# Patient Record
Sex: FEMALE | Race: ASIAN | NOT HISPANIC OR LATINO | Employment: OTHER | URBAN - METROPOLITAN AREA
[De-identification: names, ages, dates, MRNs, and addresses within clinical notes are randomized per-mention and may not be internally consistent; named-entity substitution may affect disease eponyms.]

---

## 2017-01-03 ENCOUNTER — OFFICE VISIT - HEALTHEAST (OUTPATIENT)
Dept: OCCUPATIONAL THERAPY | Facility: REHABILITATION | Age: 72
End: 2017-01-03

## 2017-01-03 DIAGNOSIS — R29.898 WEAKNESS OF RIGHT ARM: ICD-10-CM

## 2017-01-03 DIAGNOSIS — M25.531 PAIN IN JOINT, FOREARM, RIGHT: ICD-10-CM

## 2017-01-03 DIAGNOSIS — Z78.9 DECREASED ACTIVITIES OF DAILY LIVING (ADL): ICD-10-CM

## 2017-01-06 ENCOUNTER — OFFICE VISIT - HEALTHEAST (OUTPATIENT)
Dept: OCCUPATIONAL THERAPY | Facility: REHABILITATION | Age: 72
End: 2017-01-06

## 2017-01-06 DIAGNOSIS — Z78.9 DECREASED ACTIVITIES OF DAILY LIVING (ADL): ICD-10-CM

## 2017-01-06 DIAGNOSIS — R29.898 WEAKNESS OF RIGHT ARM: ICD-10-CM

## 2017-01-06 DIAGNOSIS — M25.531 PAIN IN JOINT, FOREARM, RIGHT: ICD-10-CM

## 2017-01-16 ENCOUNTER — COMMUNICATION - HEALTHEAST (OUTPATIENT)
Dept: INTERNAL MEDICINE | Facility: CLINIC | Age: 72
End: 2017-01-16

## 2017-01-16 DIAGNOSIS — M81.0 SENILE OSTEOPOROSIS: ICD-10-CM

## 2017-01-16 DIAGNOSIS — M89.9 DISORDER OF BONE AND CARTILAGE, UNSPECIFIED: ICD-10-CM

## 2017-01-16 DIAGNOSIS — M94.9 DISORDER OF BONE AND CARTILAGE, UNSPECIFIED: ICD-10-CM

## 2017-02-21 ENCOUNTER — COMMUNICATION - HEALTHEAST (OUTPATIENT)
Dept: INTERNAL MEDICINE | Facility: CLINIC | Age: 72
End: 2017-02-21

## 2017-02-21 DIAGNOSIS — M81.0 SENILE OSTEOPOROSIS: ICD-10-CM

## 2017-02-21 DIAGNOSIS — M89.9 DISORDER OF BONE AND CARTILAGE, UNSPECIFIED: ICD-10-CM

## 2017-02-21 DIAGNOSIS — M94.9 DISORDER OF BONE AND CARTILAGE, UNSPECIFIED: ICD-10-CM

## 2017-02-21 DIAGNOSIS — E78.5 HYPERLIPEMIA: ICD-10-CM

## 2017-02-23 ENCOUNTER — OFFICE VISIT - HEALTHEAST (OUTPATIENT)
Dept: INTERNAL MEDICINE | Facility: CLINIC | Age: 72
End: 2017-02-23

## 2017-02-23 DIAGNOSIS — J20.9 ACUTE BRONCHITIS, UNSPECIFIED ORGANISM: ICD-10-CM

## 2017-02-23 ASSESSMENT — MIFFLIN-ST. JEOR: SCORE: 970.1

## 2017-02-27 ENCOUNTER — COMMUNICATION - HEALTHEAST (OUTPATIENT)
Dept: INTERNAL MEDICINE | Facility: CLINIC | Age: 72
End: 2017-02-27

## 2017-02-27 DIAGNOSIS — E78.5 HYPERLIPEMIA: ICD-10-CM

## 2017-03-23 ENCOUNTER — COMMUNICATION - HEALTHEAST (OUTPATIENT)
Dept: INTERNAL MEDICINE | Facility: CLINIC | Age: 72
End: 2017-03-23

## 2017-03-23 DIAGNOSIS — E78.5 HYPERLIPEMIA: ICD-10-CM

## 2017-03-31 ENCOUNTER — COMMUNICATION - HEALTHEAST (OUTPATIENT)
Dept: INTERNAL MEDICINE | Facility: CLINIC | Age: 72
End: 2017-03-31

## 2017-03-31 DIAGNOSIS — M94.9 DISORDER OF BONE AND CARTILAGE, UNSPECIFIED: ICD-10-CM

## 2017-03-31 DIAGNOSIS — M89.9 DISORDER OF BONE AND CARTILAGE, UNSPECIFIED: ICD-10-CM

## 2017-03-31 DIAGNOSIS — M81.0 SENILE OSTEOPOROSIS: ICD-10-CM

## 2017-04-13 ENCOUNTER — COMMUNICATION - HEALTHEAST (OUTPATIENT)
Dept: INTERNAL MEDICINE | Facility: CLINIC | Age: 72
End: 2017-04-13

## 2017-04-13 DIAGNOSIS — M89.9 DISORDER OF BONE AND CARTILAGE, UNSPECIFIED: ICD-10-CM

## 2017-04-13 DIAGNOSIS — M94.9 DISORDER OF BONE AND CARTILAGE, UNSPECIFIED: ICD-10-CM

## 2017-04-13 DIAGNOSIS — M81.0 SENILE OSTEOPOROSIS: ICD-10-CM

## 2017-04-16 ENCOUNTER — COMMUNICATION - HEALTHEAST (OUTPATIENT)
Dept: INTERNAL MEDICINE | Facility: CLINIC | Age: 72
End: 2017-04-16

## 2017-04-16 DIAGNOSIS — E78.5 HYPERLIPEMIA: ICD-10-CM

## 2017-06-08 ENCOUNTER — COMMUNICATION - HEALTHEAST (OUTPATIENT)
Dept: INTERNAL MEDICINE | Facility: CLINIC | Age: 72
End: 2017-06-08

## 2017-06-08 DIAGNOSIS — E78.5 HYPERLIPEMIA: ICD-10-CM

## 2017-08-31 ENCOUNTER — RECORDS - HEALTHEAST (OUTPATIENT)
Dept: ADMINISTRATIVE | Facility: OTHER | Age: 72
End: 2017-08-31

## 2017-09-01 ENCOUNTER — COMMUNICATION - HEALTHEAST (OUTPATIENT)
Dept: INTERNAL MEDICINE | Facility: CLINIC | Age: 72
End: 2017-09-01

## 2017-09-06 ENCOUNTER — RECORDS - HEALTHEAST (OUTPATIENT)
Dept: ADMINISTRATIVE | Facility: OTHER | Age: 72
End: 2017-09-06

## 2017-11-02 ENCOUNTER — OFFICE VISIT - HEALTHEAST (OUTPATIENT)
Dept: INTERNAL MEDICINE | Facility: CLINIC | Age: 72
End: 2017-11-02

## 2017-11-02 ENCOUNTER — COMMUNICATION - HEALTHEAST (OUTPATIENT)
Dept: TELEHEALTH | Facility: CLINIC | Age: 72
End: 2017-11-02

## 2017-11-02 DIAGNOSIS — E55.9 VITAMIN D DEFICIENCY: ICD-10-CM

## 2017-11-02 DIAGNOSIS — R06.00 DYSPNEA: ICD-10-CM

## 2017-11-02 DIAGNOSIS — I50.9 CHF (CONGESTIVE HEART FAILURE) (H): ICD-10-CM

## 2017-11-02 DIAGNOSIS — R53.83 FATIGUE: ICD-10-CM

## 2017-11-02 DIAGNOSIS — E78.5 HYPERLIPEMIA: ICD-10-CM

## 2017-11-02 ASSESSMENT — MIFFLIN-ST. JEOR: SCORE: 979.62

## 2017-11-03 LAB
ATRIAL RATE - MUSE: 60 BPM
DIASTOLIC BLOOD PRESSURE - MUSE: NORMAL MMHG
INTERPRETATION ECG - MUSE: NORMAL
P AXIS - MUSE: 61 DEGREES
PR INTERVAL - MUSE: 152 MS
QRS DURATION - MUSE: 92 MS
QT - MUSE: 420 MS
QTC - MUSE: 420 MS
R AXIS - MUSE: 42 DEGREES
SYSTOLIC BLOOD PRESSURE - MUSE: NORMAL MMHG
T AXIS - MUSE: 19 DEGREES
VENTRICULAR RATE- MUSE: 60 BPM

## 2017-11-05 ENCOUNTER — COMMUNICATION - HEALTHEAST (OUTPATIENT)
Dept: INTERNAL MEDICINE | Facility: CLINIC | Age: 72
End: 2017-11-05

## 2017-11-17 ENCOUNTER — COMMUNICATION - HEALTHEAST (OUTPATIENT)
Dept: INTERNAL MEDICINE | Facility: CLINIC | Age: 72
End: 2017-11-17

## 2017-11-17 DIAGNOSIS — E78.5 HYPERLIPEMIA: ICD-10-CM

## 2017-11-28 ENCOUNTER — COMMUNICATION - HEALTHEAST (OUTPATIENT)
Dept: INTERNAL MEDICINE | Facility: CLINIC | Age: 72
End: 2017-11-28

## 2017-11-28 ENCOUNTER — RECORDS - HEALTHEAST (OUTPATIENT)
Dept: MAMMOGRAPHY | Facility: CLINIC | Age: 72
End: 2017-11-28

## 2017-11-28 DIAGNOSIS — Z12.31 ENCOUNTER FOR SCREENING MAMMOGRAM FOR MALIGNANT NEOPLASM OF BREAST: ICD-10-CM

## 2017-12-22 ENCOUNTER — COMMUNICATION - HEALTHEAST (OUTPATIENT)
Dept: INTERNAL MEDICINE | Facility: CLINIC | Age: 72
End: 2017-12-22

## 2017-12-22 DIAGNOSIS — Z91.09 ENVIRONMENTAL ALLERGIES: ICD-10-CM

## 2018-01-09 ENCOUNTER — OFFICE VISIT - HEALTHEAST (OUTPATIENT)
Dept: INTERNAL MEDICINE | Facility: CLINIC | Age: 73
End: 2018-01-09

## 2018-01-09 DIAGNOSIS — Z91.09 ENVIRONMENTAL ALLERGIES: ICD-10-CM

## 2018-01-09 DIAGNOSIS — Z79.899 MEDICATION MANAGEMENT: ICD-10-CM

## 2018-01-09 DIAGNOSIS — A09 TRAVELER'S DIARRHEA: ICD-10-CM

## 2018-01-09 DIAGNOSIS — E78.5 HYPERLIPEMIA: ICD-10-CM

## 2018-03-14 ENCOUNTER — COMMUNICATION - HEALTHEAST (OUTPATIENT)
Dept: INTERNAL MEDICINE | Facility: CLINIC | Age: 73
End: 2018-03-14

## 2018-03-14 DIAGNOSIS — M81.0 SENILE OSTEOPOROSIS: ICD-10-CM

## 2018-03-14 DIAGNOSIS — M94.9 DISORDER OF BONE AND CARTILAGE: ICD-10-CM

## 2018-03-14 DIAGNOSIS — M89.9 DISORDER OF BONE AND CARTILAGE: ICD-10-CM

## 2018-05-07 ENCOUNTER — OFFICE VISIT - HEALTHEAST (OUTPATIENT)
Dept: INTERNAL MEDICINE | Facility: CLINIC | Age: 73
End: 2018-05-07

## 2018-05-07 ENCOUNTER — COMMUNICATION - HEALTHEAST (OUTPATIENT)
Dept: INTERNAL MEDICINE | Facility: CLINIC | Age: 73
End: 2018-05-07

## 2018-05-07 DIAGNOSIS — Z12.9 SCREENING FOR CANCER: ICD-10-CM

## 2018-05-07 DIAGNOSIS — Z00.00 HEALTH CARE MAINTENANCE: ICD-10-CM

## 2018-05-07 DIAGNOSIS — M25.561 KNEE PAIN, BILATERAL: ICD-10-CM

## 2018-05-07 DIAGNOSIS — Z91.09 ENVIRONMENTAL ALLERGIES: ICD-10-CM

## 2018-05-07 DIAGNOSIS — E78.5 HYPERLIPEMIA: ICD-10-CM

## 2018-05-07 DIAGNOSIS — M25.562 KNEE PAIN, BILATERAL: ICD-10-CM

## 2018-05-07 LAB
ALBUMIN SERPL-MCNC: 3.8 G/DL (ref 3.5–5)
ALP SERPL-CCNC: 67 U/L (ref 45–120)
ALT SERPL W P-5'-P-CCNC: 14 U/L (ref 0–45)
ANION GAP SERPL CALCULATED.3IONS-SCNC: 9 MMOL/L (ref 5–18)
AST SERPL W P-5'-P-CCNC: 22 U/L (ref 0–40)
BILIRUB SERPL-MCNC: 0.7 MG/DL (ref 0–1)
BUN SERPL-MCNC: 20 MG/DL (ref 8–28)
CALCIUM SERPL-MCNC: 9.2 MG/DL (ref 8.5–10.5)
CHLORIDE BLD-SCNC: 107 MMOL/L (ref 98–107)
CHOLEST SERPL-MCNC: 178 MG/DL
CO2 SERPL-SCNC: 26 MMOL/L (ref 22–31)
CREAT SERPL-MCNC: 0.69 MG/DL (ref 0.6–1.1)
ERYTHROCYTE [DISTWIDTH] IN BLOOD BY AUTOMATED COUNT: 12.4 % (ref 11–14.5)
FASTING STATUS PATIENT QL REPORTED: YES
GFR SERPL CREATININE-BSD FRML MDRD: >60 ML/MIN/1.73M2
GLUCOSE BLD-MCNC: 89 MG/DL (ref 70–125)
HCT VFR BLD AUTO: 37.3 % (ref 35–47)
HDLC SERPL-MCNC: 66 MG/DL
HGB BLD-MCNC: 12.5 G/DL (ref 12–16)
LDLC SERPL CALC-MCNC: 96 MG/DL
MCH RBC QN AUTO: 30.1 PG (ref 27–34)
MCHC RBC AUTO-ENTMCNC: 33.4 G/DL (ref 32–36)
MCV RBC AUTO: 90 FL (ref 80–100)
PLATELET # BLD AUTO: 173 THOU/UL (ref 140–440)
PMV BLD AUTO: 8.2 FL (ref 7–10)
POTASSIUM BLD-SCNC: 3.8 MMOL/L (ref 3.5–5)
PROT SERPL-MCNC: 7.2 G/DL (ref 6–8)
RBC # BLD AUTO: 4.15 MILL/UL (ref 3.8–5.4)
SODIUM SERPL-SCNC: 142 MMOL/L (ref 136–145)
TRIGL SERPL-MCNC: 78 MG/DL
TSH SERPL DL<=0.005 MIU/L-ACNC: 1.57 UIU/ML (ref 0.3–5)
WBC: 3.7 THOU/UL (ref 4–11)

## 2018-05-07 ASSESSMENT — MIFFLIN-ST. JEOR: SCORE: 973.27

## 2018-05-08 ENCOUNTER — COMMUNICATION - HEALTHEAST (OUTPATIENT)
Dept: INTERNAL MEDICINE | Facility: CLINIC | Age: 73
End: 2018-05-08

## 2018-06-13 ENCOUNTER — RECORDS - HEALTHEAST (OUTPATIENT)
Dept: ADMINISTRATIVE | Facility: OTHER | Age: 73
End: 2018-06-13

## 2018-06-23 ENCOUNTER — AMBULATORY - HEALTHEAST (OUTPATIENT)
Dept: INTERNAL MEDICINE | Facility: CLINIC | Age: 73
End: 2018-06-23

## 2018-06-23 DIAGNOSIS — M25.561 KNEE PAIN, BILATERAL: ICD-10-CM

## 2018-06-23 DIAGNOSIS — M25.562 KNEE PAIN, BILATERAL: ICD-10-CM

## 2018-06-28 ENCOUNTER — OFFICE VISIT - HEALTHEAST (OUTPATIENT)
Dept: PHYSICAL THERAPY | Facility: REHABILITATION | Age: 73
End: 2018-06-28

## 2018-06-28 DIAGNOSIS — G89.29 CHRONIC PAIN OF BOTH KNEES: ICD-10-CM

## 2018-06-28 DIAGNOSIS — M25.562 CHRONIC PAIN OF BOTH KNEES: ICD-10-CM

## 2018-06-28 DIAGNOSIS — M25.561 CHRONIC PAIN OF BOTH KNEES: ICD-10-CM

## 2018-07-05 ENCOUNTER — OFFICE VISIT - HEALTHEAST (OUTPATIENT)
Dept: PHYSICAL THERAPY | Facility: REHABILITATION | Age: 73
End: 2018-07-05

## 2018-07-05 DIAGNOSIS — M25.561 CHRONIC PAIN OF BOTH KNEES: ICD-10-CM

## 2018-07-05 DIAGNOSIS — M25.562 CHRONIC PAIN OF BOTH KNEES: ICD-10-CM

## 2018-07-05 DIAGNOSIS — G89.29 CHRONIC PAIN OF BOTH KNEES: ICD-10-CM

## 2018-07-09 ENCOUNTER — OFFICE VISIT - HEALTHEAST (OUTPATIENT)
Dept: PHYSICAL THERAPY | Facility: REHABILITATION | Age: 73
End: 2018-07-09

## 2018-07-09 DIAGNOSIS — G89.29 CHRONIC PAIN OF BOTH KNEES: ICD-10-CM

## 2018-07-09 DIAGNOSIS — M25.562 CHRONIC PAIN OF BOTH KNEES: ICD-10-CM

## 2018-07-09 DIAGNOSIS — M25.561 CHRONIC PAIN OF BOTH KNEES: ICD-10-CM

## 2018-07-16 ENCOUNTER — OFFICE VISIT - HEALTHEAST (OUTPATIENT)
Dept: PHYSICAL THERAPY | Facility: REHABILITATION | Age: 73
End: 2018-07-16

## 2018-07-16 DIAGNOSIS — M25.561 CHRONIC PAIN OF BOTH KNEES: ICD-10-CM

## 2018-07-16 DIAGNOSIS — M25.562 CHRONIC PAIN OF BOTH KNEES: ICD-10-CM

## 2018-07-16 DIAGNOSIS — G89.29 CHRONIC PAIN OF BOTH KNEES: ICD-10-CM

## 2018-07-20 ENCOUNTER — OFFICE VISIT - HEALTHEAST (OUTPATIENT)
Dept: PHYSICAL THERAPY | Facility: REHABILITATION | Age: 73
End: 2018-07-20

## 2018-07-20 DIAGNOSIS — M25.561 CHRONIC PAIN OF BOTH KNEES: ICD-10-CM

## 2018-07-20 DIAGNOSIS — M25.562 CHRONIC PAIN OF BOTH KNEES: ICD-10-CM

## 2018-07-20 DIAGNOSIS — G89.29 CHRONIC PAIN OF BOTH KNEES: ICD-10-CM

## 2018-07-24 ENCOUNTER — OFFICE VISIT - HEALTHEAST (OUTPATIENT)
Dept: PHYSICAL THERAPY | Facility: REHABILITATION | Age: 73
End: 2018-07-24

## 2018-07-24 DIAGNOSIS — G89.29 CHRONIC PAIN OF BOTH KNEES: ICD-10-CM

## 2018-07-24 DIAGNOSIS — M25.562 CHRONIC PAIN OF BOTH KNEES: ICD-10-CM

## 2018-07-24 DIAGNOSIS — M25.561 CHRONIC PAIN OF BOTH KNEES: ICD-10-CM

## 2018-10-18 ENCOUNTER — COMMUNICATION - HEALTHEAST (OUTPATIENT)
Dept: INTERNAL MEDICINE | Facility: CLINIC | Age: 73
End: 2018-10-18

## 2018-10-18 DIAGNOSIS — Z91.09 ENVIRONMENTAL ALLERGIES: ICD-10-CM

## 2018-10-22 ENCOUNTER — COMMUNICATION - HEALTHEAST (OUTPATIENT)
Dept: INTERNAL MEDICINE | Facility: CLINIC | Age: 73
End: 2018-10-22

## 2018-12-02 ENCOUNTER — COMMUNICATION - HEALTHEAST (OUTPATIENT)
Dept: INTERNAL MEDICINE | Facility: CLINIC | Age: 73
End: 2018-12-02

## 2018-12-02 DIAGNOSIS — E78.5 HYPERLIPEMIA: ICD-10-CM

## 2019-01-08 ENCOUNTER — COMMUNICATION - HEALTHEAST (OUTPATIENT)
Dept: INTERNAL MEDICINE | Facility: CLINIC | Age: 74
End: 2019-01-08

## 2019-01-08 DIAGNOSIS — M89.9 DISORDER OF BONE AND CARTILAGE: ICD-10-CM

## 2019-01-08 DIAGNOSIS — M94.9 DISORDER OF BONE AND CARTILAGE: ICD-10-CM

## 2019-01-08 DIAGNOSIS — M81.0 SENILE OSTEOPOROSIS: ICD-10-CM

## 2019-01-24 ENCOUNTER — COMMUNICATION - HEALTHEAST (OUTPATIENT)
Dept: INTERNAL MEDICINE | Facility: CLINIC | Age: 74
End: 2019-01-24

## 2019-01-24 DIAGNOSIS — Z91.09 ENVIRONMENTAL ALLERGIES: ICD-10-CM

## 2019-04-12 ENCOUNTER — COMMUNICATION - HEALTHEAST (OUTPATIENT)
Dept: GERIATRIC MEDICINE | Facility: CLINIC | Age: 74
End: 2019-04-12

## 2019-04-16 ENCOUNTER — COMMUNICATION - HEALTHEAST (OUTPATIENT)
Dept: GERIATRIC MEDICINE | Facility: CLINIC | Age: 74
End: 2019-04-16

## 2019-04-30 ENCOUNTER — COMMUNICATION - HEALTHEAST (OUTPATIENT)
Dept: GERIATRIC MEDICINE | Facility: CLINIC | Age: 74
End: 2019-04-30

## 2019-05-14 ENCOUNTER — COMMUNICATION - HEALTHEAST (OUTPATIENT)
Dept: GERIATRIC MEDICINE | Facility: CLINIC | Age: 74
End: 2019-05-14

## 2019-05-15 ENCOUNTER — COMMUNICATION - HEALTHEAST (OUTPATIENT)
Dept: GERIATRIC MEDICINE | Facility: CLINIC | Age: 74
End: 2019-05-15

## 2019-05-16 ENCOUNTER — COMMUNICATION - HEALTHEAST (OUTPATIENT)
Dept: GERIATRIC MEDICINE | Facility: CLINIC | Age: 74
End: 2019-05-16

## 2019-05-29 ENCOUNTER — AMBULATORY - HEALTHEAST (OUTPATIENT)
Dept: INTERNAL MEDICINE | Facility: CLINIC | Age: 74
End: 2019-05-29

## 2019-05-29 ENCOUNTER — RECORDS - HEALTHEAST (OUTPATIENT)
Dept: GENERAL RADIOLOGY | Facility: CLINIC | Age: 74
End: 2019-05-29

## 2019-05-29 ENCOUNTER — COMMUNICATION - HEALTHEAST (OUTPATIENT)
Dept: INTERNAL MEDICINE | Facility: CLINIC | Age: 74
End: 2019-05-29

## 2019-05-29 ENCOUNTER — OFFICE VISIT - HEALTHEAST (OUTPATIENT)
Dept: INTERNAL MEDICINE | Facility: CLINIC | Age: 74
End: 2019-05-29

## 2019-05-29 DIAGNOSIS — M79.662 PAIN OF LEFT LOWER LEG: ICD-10-CM

## 2019-05-29 DIAGNOSIS — M79.662 PAIN IN LEFT LOWER LEG: ICD-10-CM

## 2019-05-29 DIAGNOSIS — Z12.11 SCREEN FOR COLON CANCER: ICD-10-CM

## 2019-05-29 ASSESSMENT — MIFFLIN-ST. JEOR: SCORE: 988.53

## 2019-05-30 ENCOUNTER — COMMUNICATION - HEALTHEAST (OUTPATIENT)
Dept: INTERNAL MEDICINE | Facility: CLINIC | Age: 74
End: 2019-05-30

## 2019-06-06 ENCOUNTER — COMMUNICATION - HEALTHEAST (OUTPATIENT)
Dept: GERIATRIC MEDICINE | Facility: CLINIC | Age: 74
End: 2019-06-06

## 2019-06-07 ENCOUNTER — RECORDS - HEALTHEAST (OUTPATIENT)
Dept: ADMINISTRATIVE | Facility: OTHER | Age: 74
End: 2019-06-07

## 2019-06-07 LAB — COLOGUARD-ABSTRACT: NEGATIVE

## 2019-06-17 ENCOUNTER — COMMUNICATION - HEALTHEAST (OUTPATIENT)
Dept: INTERNAL MEDICINE | Facility: CLINIC | Age: 74
End: 2019-06-17

## 2019-06-17 DIAGNOSIS — E78.5 HYPERLIPEMIA: ICD-10-CM

## 2019-06-27 ENCOUNTER — RECORDS - HEALTHEAST (OUTPATIENT)
Dept: HEALTH INFORMATION MANAGEMENT | Facility: CLINIC | Age: 74
End: 2019-06-27

## 2019-08-04 ENCOUNTER — COMMUNICATION - HEALTHEAST (OUTPATIENT)
Dept: INTERNAL MEDICINE | Facility: CLINIC | Age: 74
End: 2019-08-04

## 2019-08-04 DIAGNOSIS — Z91.09 ENVIRONMENTAL ALLERGIES: ICD-10-CM

## 2019-09-13 ENCOUNTER — COMMUNICATION - HEALTHEAST (OUTPATIENT)
Dept: INTERNAL MEDICINE | Facility: CLINIC | Age: 74
End: 2019-09-13

## 2019-09-13 DIAGNOSIS — Z91.09 ENVIRONMENTAL ALLERGIES: ICD-10-CM

## 2019-10-12 ENCOUNTER — COMMUNICATION - HEALTHEAST (OUTPATIENT)
Dept: INTERNAL MEDICINE | Facility: CLINIC | Age: 74
End: 2019-10-12

## 2019-10-12 DIAGNOSIS — Z91.09 ENVIRONMENTAL ALLERGIES: ICD-10-CM

## 2019-10-17 ENCOUNTER — OFFICE VISIT - HEALTHEAST (OUTPATIENT)
Dept: INTERNAL MEDICINE | Facility: CLINIC | Age: 74
End: 2019-10-17

## 2019-10-17 DIAGNOSIS — M81.0 SENILE OSTEOPOROSIS: ICD-10-CM

## 2019-10-17 DIAGNOSIS — E78.5 HYPERLIPIDEMIA, UNSPECIFIED HYPERLIPIDEMIA TYPE: ICD-10-CM

## 2019-10-17 DIAGNOSIS — R54 SENESCENCE: ICD-10-CM

## 2019-10-17 ASSESSMENT — PATIENT HEALTH QUESTIONNAIRE - PHQ9: SUM OF ALL RESPONSES TO PHQ QUESTIONS 1-9: 14

## 2019-10-17 ASSESSMENT — MIFFLIN-ST. JEOR: SCORE: 991.36

## 2019-10-29 ENCOUNTER — RECORDS - HEALTHEAST (OUTPATIENT)
Dept: ADMINISTRATIVE | Facility: OTHER | Age: 74
End: 2019-10-29

## 2019-10-29 ENCOUNTER — RECORDS - HEALTHEAST (OUTPATIENT)
Dept: BONE DENSITY | Facility: CLINIC | Age: 74
End: 2019-10-29

## 2019-10-29 DIAGNOSIS — M81.0 AGE-RELATED OSTEOPOROSIS WITHOUT CURRENT PATHOLOGICAL FRACTURE: ICD-10-CM

## 2019-11-01 ENCOUNTER — COMMUNICATION - HEALTHEAST (OUTPATIENT)
Dept: INTERNAL MEDICINE | Facility: CLINIC | Age: 74
End: 2019-11-01

## 2019-11-12 ENCOUNTER — COMMUNICATION - HEALTHEAST (OUTPATIENT)
Dept: GERIATRIC MEDICINE | Facility: CLINIC | Age: 74
End: 2019-11-12

## 2019-11-27 ENCOUNTER — COMMUNICATION - HEALTHEAST (OUTPATIENT)
Dept: GERIATRIC MEDICINE | Facility: CLINIC | Age: 74
End: 2019-11-27

## 2019-12-02 ENCOUNTER — COMMUNICATION - HEALTHEAST (OUTPATIENT)
Dept: GERIATRIC MEDICINE | Facility: CLINIC | Age: 74
End: 2019-12-02

## 2020-03-27 ENCOUNTER — COMMUNICATION - HEALTHEAST (OUTPATIENT)
Dept: INTERNAL MEDICINE | Facility: CLINIC | Age: 75
End: 2020-03-27

## 2020-03-27 DIAGNOSIS — Z91.09 ENVIRONMENTAL ALLERGIES: ICD-10-CM

## 2020-04-28 ENCOUNTER — COMMUNICATION - HEALTHEAST (OUTPATIENT)
Dept: INTERNAL MEDICINE | Facility: CLINIC | Age: 75
End: 2020-04-28

## 2020-04-28 DIAGNOSIS — Z91.09 ENVIRONMENTAL ALLERGIES: ICD-10-CM

## 2020-04-29 ENCOUNTER — COMMUNICATION - HEALTHEAST (OUTPATIENT)
Dept: GERIATRIC MEDICINE | Facility: CLINIC | Age: 75
End: 2020-04-29

## 2020-05-24 ENCOUNTER — COMMUNICATION - HEALTHEAST (OUTPATIENT)
Dept: INTERNAL MEDICINE | Facility: CLINIC | Age: 75
End: 2020-05-24

## 2020-05-24 DIAGNOSIS — Z91.09 ENVIRONMENTAL ALLERGIES: ICD-10-CM

## 2020-06-05 ENCOUNTER — COMMUNICATION - HEALTHEAST (OUTPATIENT)
Dept: INTERNAL MEDICINE | Facility: CLINIC | Age: 75
End: 2020-06-05

## 2020-06-05 DIAGNOSIS — E78.5 HYPERLIPEMIA: ICD-10-CM

## 2020-07-21 ENCOUNTER — COMMUNICATION - HEALTHEAST (OUTPATIENT)
Dept: GERIATRIC MEDICINE | Facility: CLINIC | Age: 75
End: 2020-07-21

## 2020-08-19 ENCOUNTER — COMMUNICATION - HEALTHEAST (OUTPATIENT)
Dept: GERIATRIC MEDICINE | Facility: CLINIC | Age: 75
End: 2020-08-19

## 2020-08-19 ASSESSMENT — ACTIVITIES OF DAILY LIVING (ADL): DEPENDENT_IADLS:: CLEANING;COOKING;LAUNDRY;SHOPPING;MEAL PREPARATION;TRANSPORTATION

## 2020-09-16 ENCOUNTER — COMMUNICATION - HEALTHEAST (OUTPATIENT)
Dept: GERIATRIC MEDICINE | Facility: CLINIC | Age: 75
End: 2020-09-16

## 2020-10-14 ENCOUNTER — COMMUNICATION - HEALTHEAST (OUTPATIENT)
Dept: GERIATRIC MEDICINE | Facility: CLINIC | Age: 75
End: 2020-10-14

## 2020-10-20 ENCOUNTER — OFFICE VISIT - HEALTHEAST (OUTPATIENT)
Dept: FAMILY MEDICINE | Facility: CLINIC | Age: 75
End: 2020-10-20

## 2020-10-20 DIAGNOSIS — F43.20 ADJUSTMENT DISORDER, UNSPECIFIED TYPE: ICD-10-CM

## 2020-10-20 DIAGNOSIS — Z00.00 ROUTINE GENERAL MEDICAL EXAMINATION AT A HEALTH CARE FACILITY: ICD-10-CM

## 2020-10-20 DIAGNOSIS — R03.0 ELEVATED BLOOD PRESSURE READING WITHOUT DIAGNOSIS OF HYPERTENSION: ICD-10-CM

## 2020-10-20 DIAGNOSIS — R53.83 FATIGUE, UNSPECIFIED TYPE: ICD-10-CM

## 2020-10-20 DIAGNOSIS — E55.9 VITAMIN D DEFICIENCY: ICD-10-CM

## 2020-10-20 LAB
ANION GAP SERPL CALCULATED.3IONS-SCNC: 9 MMOL/L (ref 5–18)
BUN SERPL-MCNC: 17 MG/DL (ref 8–28)
CALCIUM SERPL-MCNC: 8.9 MG/DL (ref 8.5–10.5)
CHLORIDE BLD-SCNC: 106 MMOL/L (ref 98–107)
CHOLEST SERPL-MCNC: 171 MG/DL
CO2 SERPL-SCNC: 29 MMOL/L (ref 22–31)
CREAT SERPL-MCNC: 0.77 MG/DL (ref 0.6–1.1)
FASTING STATUS PATIENT QL REPORTED: YES
GFR SERPL CREATININE-BSD FRML MDRD: >60 ML/MIN/1.73M2
GLUCOSE BLD-MCNC: 91 MG/DL (ref 70–125)
HDLC SERPL-MCNC: 64 MG/DL
HGB BLD-MCNC: 13.2 G/DL (ref 12–16)
LDLC SERPL CALC-MCNC: 87 MG/DL
POTASSIUM BLD-SCNC: 3.8 MMOL/L (ref 3.5–5)
SODIUM SERPL-SCNC: 144 MMOL/L (ref 136–145)
TRIGL SERPL-MCNC: 99 MG/DL
TSH SERPL DL<=0.005 MIU/L-ACNC: 1.74 UIU/ML (ref 0.3–5)

## 2020-10-20 ASSESSMENT — MIFFLIN-ST. JEOR: SCORE: 1026.8

## 2020-10-21 LAB — 25(OH)D3 SERPL-MCNC: 20.4 NG/ML (ref 30–80)

## 2020-10-27 ENCOUNTER — COMMUNICATION - HEALTHEAST (OUTPATIENT)
Dept: FAMILY MEDICINE | Facility: CLINIC | Age: 75
End: 2020-10-27

## 2020-12-02 ENCOUNTER — COMMUNICATION - HEALTHEAST (OUTPATIENT)
Dept: GERIATRIC MEDICINE | Facility: CLINIC | Age: 75
End: 2020-12-02

## 2020-12-13 ENCOUNTER — COMMUNICATION - HEALTHEAST (OUTPATIENT)
Dept: INTERNAL MEDICINE | Facility: CLINIC | Age: 75
End: 2020-12-13

## 2020-12-13 DIAGNOSIS — E78.5 HYPERLIPEMIA: ICD-10-CM

## 2021-02-03 ENCOUNTER — COMMUNICATION - HEALTHEAST (OUTPATIENT)
Dept: GERIATRIC MEDICINE | Facility: CLINIC | Age: 76
End: 2021-02-03

## 2021-02-10 ENCOUNTER — COMMUNICATION - HEALTHEAST (OUTPATIENT)
Dept: GERIATRIC MEDICINE | Facility: CLINIC | Age: 76
End: 2021-02-10

## 2021-03-07 ENCOUNTER — COMMUNICATION - HEALTHEAST (OUTPATIENT)
Dept: INTERNAL MEDICINE | Facility: CLINIC | Age: 76
End: 2021-03-07

## 2021-03-07 DIAGNOSIS — Z91.09 ENVIRONMENTAL ALLERGIES: ICD-10-CM

## 2021-03-07 RX ORDER — MOMETASONE FUROATE MONOHYDRATE 50 UG/1
SPRAY, METERED NASAL
Qty: 17 G | Refills: 7 | Status: SHIPPED | OUTPATIENT
Start: 2021-03-07 | End: 2021-09-16

## 2021-03-30 ENCOUNTER — COMMUNICATION - HEALTHEAST (OUTPATIENT)
Dept: GERIATRIC MEDICINE | Facility: CLINIC | Age: 76
End: 2021-03-30

## 2021-05-26 ASSESSMENT — PATIENT HEALTH QUESTIONNAIRE - PHQ9: SUM OF ALL RESPONSES TO PHQ QUESTIONS 1-9: 14

## 2021-05-27 NOTE — PROGRESS NOTES
[facility] Care Coordination Contact    Called member to schedule annual HRA home visit. HRA has been scheduled for 4/16/19.    request form faxed to [facility].    Ismael Hanson RN, PHN   [facility]  [phone]

## 2021-05-27 NOTE — PROGRESS NOTES
[facility] Care Coordination Contact  [facility] Home Visit Assessment     Home visit for Health Risk Assessment with Elkin Castaneda completed on 4/16/2019    Type of residence:: Apartment - handicap accessible  Current living arrangement:: I live alone     Assessment completed with:: Patient, Care Team Member, Other--Armenian      Current Care Plan  Member currently receiving the following home care services:   None at this time. She requested for  services to assist with cleaning. [id number] completed and faxed to [facility] with [phone].   Member currently receiving the following community resources: None      Medication Review  Medication reconciliation completed in Epic: YES  Medication set-up & administration: Independent-does not set up  Self-administers medications  Medication Risk Assessment Medication (1 or more, place referral to MTM):  N/A: No risk factors identified  MTM Referral Placed: No: No risk factors idenified    Mental/Behavioral Health   Depression Screening: See PHQ assessment flowsheet.   Mental health DX:: No      Mental Health Diagnosis: No  Mental Health Services: None: No further intervention needed at this time.    Falls Assessment:   Fallen 2 or more times in the past year?: Yes(slipped on ice )   Any fall with injury in the past year?: No    ADL/IADL Dependencies:   Dependent ADLs:: Independent  Dependent IADLs:: Cleaning, Transportation(daughter vacuumes every 2-3 weeks)    [facility] sponsored benefits: Shared information re: Silver Sneakers/gym memberships, ASA, Calcium +D.    PCA Assessment completed at visit: No    Elderly Waiver Eligibility: Yes - will open to [facility] financial worker: Marie Stafford   Tel: [phone]  Fax: [phone]    Care Plan & Recommendations: Mbr will continue to live independently and safely in the community. She will discuss with provider about rolling walker with seat and inform CC of approval. CC will initiate  request at that time. Mbr will discuss with provider about sleep issue. Mbr changed PCC. PCP is Gauri Begum at the [facility].     See [facility] for detailed assessment information.    Follow-Up Plan: Member informed of future contact, plan to f/u with member with a 6 month telephone assessment.  Contact information shared with member and family, encouraged member to call with any questions or concerns at any time.    [facility] continuum providers: Please refer to Health Care Home on the Epic Problem List to view this patient's [facility] Care Plan Summary.    Ismael Hanson RN, PHN   [facility]  [phone]

## 2021-05-28 NOTE — PROGRESS NOTES
[facility] Care Coordination Contact    Called financial worker to follow up on status of EW. Marie reports the U code was removed on 4/29/19.    LTC entered in MMIS to reflect rate cell B effective 5/15/19. Document approved.    Called member via [employer] and discussed choice of home care agency for homemaking service. She would like [facility]anda at [phone] to be the homemaker. Member does not know the name and phone number for the home care.    Attempted to reach Randa, not available and left  voice mail to return call.    Ismael Hanson RN, PHN   [facility]  [phone]

## 2021-05-28 NOTE — PROGRESS NOTES
[facility] Care Coordination Contact    LTC entered in MMIS and approved.     Ismael Hanson RN, PHN   [facility]  [phone]

## 2021-05-28 NOTE — PROGRESS NOTES
[facility] Care Coordination Contact    Received after visit chart from care coordinator.  Completed following tasks: Mailed copy of care plan to client    Marcos Jimenez  Care Management Specialist  [facility]  [phone]

## 2021-05-29 NOTE — PATIENT INSTRUCTIONS - HE
If any significant increase in swelling, redness, pain, fever, weakness, numbness or other concerning symptom seek medical care immediately.

## 2021-05-29 NOTE — PROGRESS NOTES
[facility] Clinic Note    Patient Name: Elkin Castaneda  Patient Age: 73 y.o.  YOB: 1945  MRN: [id number]    Date of visit: 5/29/2019    Assessment/Plan:  No results found for this or any previous visit (from the past 24 hour(s)).  No medications were ordered this encounter      ICD-10-CM    1. Screen for colon cancer Z12.11 Cologuard   2. Pain of left lower leg M79.662 US Venous Leg Left     XR Knee Left 1 or 2 VWS     Walker Standard (2 Wheel)       Most likely muscular, will get an x-ray and an ultrasound as well to rule out Bakers cyst or blood clot.  We will try Tylenol in the meantime.  Follow-up within 1 week if not improving.    Patient Instructions   If any significant increase in swelling, redness, pain, fever, weakness, numbness or other concerning symptom seek medical care immediately.        Counseled patient regarding treatments, treatment options, risks and benefits and diagnosis.  The patient was interactive, attentive, verbalized understanding, and we discussed plan.       Patient Active Problem List   Diagnosis     Hyperlipidemia     Osteoarthritis     Osteopenia     Vitamin D deficiency     Social History     Social History Narrative     Not on file     Family History   Problem Relation Age of Onset     Breast cancer Neg Hx      Outpatient Encounter Medications as of 5/29/2019   Medication Sig Dispense Refill     alendronate (FOSAMAX) 35 MG tablet TAKE 1 TABLET BY MOUTH WEEKLY 12 tablet 1     benzonatate (TESSALON) 100 MG capsule Take 1 capsule (100 mg total) by mouth 3 (three) times a day as needed for cough. 30 capsule 1     calcium carbonate (CALCIUM 500 ORAL) Take by mouth.       cholecalciferol, vitamin D3, (VITAMIN D3) 2,000 unit capsule Take 1,000 Units by mouth daily.       mometasone (NASONEX) 50 mcg/actuation nasal spray INSTILL 2 SPRAYS INTO EACH NOSTIL DAILY AS NEEDED 17 g 1     mometasone (NASONEX) 50 mcg/actuation nasal spray INSTILL 2 SPRAYS INTO EACH NOSTIL DAILY AS  "NEEDED 17 g 5     simvastatin (ZOCOR) 20 MG tablet TAKE 1 TABLET DAILY. 90 tablet 1     No facility-administered encounter medications on file as of 5/29/2019.        Chief Complaint:   Chief Complaint   Patient presents with     Leg Pain     left leg, would like a walker       /66 (Patient Site: Left Arm, Patient Position: Sitting, Cuff Size: Adult Regular)   Pulse 64   Ht 4' 10.5\" (1.486 m)   Wt 133 lb 9 oz (60.6 kg)   SpO2 98%   BMI 27.44 kg/m    HPI:   Having pain from knee to foot started last Friday.  Has history of chronic knee pain but this is different because pain is distal and on side and posterior.  No swelling. Woke up Friday with pain, no injury.  No numbness or weakness distally.  No new back pain.  Leg hurts worse with walking or with sitting for some time.     ROS: Pertinent ros findings in hpi, all other systems negative.    Objective/Physical Exam:     /66 (Patient Site: Left Arm, Patient Position: Sitting, Cuff Size: Adult Regular)   Pulse 64   Ht 4' 10.5\" (1.486 m)   Wt 133 lb 9 oz (60.6 kg)   SpO2 98%   BMI 27.44 kg/m      Gen: NAD, appears age  Skin: warm, dry  HENT: normocephalic atraumatic, MMM  Eyes: non-icteric, no proptosis  CV: NRRR no m/r/g, no peripheral edema  Resp: CTAB no w/r/r, normal respiratory effort  Abd: non-distended, soft  Hematologic: No petechiae or purpura  Neuro: no dysarthria or gross asymmetry  Psych: Cooperative, full affect    Knee: l    Able to bear weight x 4 steps: Yes  Pain at head of fibula: no  Pain at patella: no  Able to flex 90 degress: Yes    Tender at medial joint line: yes  Tender at lateral joint line: yes  Tender over MCL: no  Tender over LCL: no    Able to do full extension: Yes  Lateral or medial laxity: no    Lachman's normal: Yes  Posterior drawer normal: Yes    Effusion: no  Ecchymosis: no  Skin intact: Yes    Distally neurovascularly intact: 5/5 plantar and dorsiflexion.    Knee extension strength: 5/5  Knee flexion strength: " 5/5    No pain with hip rom.    Ankle:  NTTP, no swelling, no pain with rom.     Normal Yu's     ttp lateral thigh distally and anterior/posterior knee and posterior calf.     No significant calf edema or venous prominence.  Ehsan Elizabeth MD

## 2021-05-29 NOTE — TELEPHONE ENCOUNTER
Former patient of Robin & has not established care with another provider.  Please assign refill request to covering provider per Clinic standard process.      Refill Approved    Rx renewed per Medication Renewal Policy. Medication was last renewed on 12/2/18.    Phoebe Hurd, Care Connection Triage/Med Refill 6/17/2019     Requested Prescriptions   Pending Prescriptions Disp Refills     simvastatin (ZOCOR) 20 MG tablet [Pharmacy Med Name: SIMVASTATIN 20 MG TABLET] 90 tablet 1     Sig: TAKE 1 TABLET BY MOUTH EVERY DAY       Statins Refill Protocol (Hmg CoA Reductase Inhibitors) Passed - 6/17/2019  1:15 AM        Passed - PCP or prescribing provider visit in past 12 months      Last office visit with prescriber/PCP: 11/2/2017 Cyril Kelly MD OR same dept: 5/29/2019 Ehsan Elizabeth MD OR same specialty: 5/29/2019 Ehsan Elizabeth MD  Last physical: 5/7/2018 Last MTM visit: Visit date not found   Next visit within 3 mo: Visit date not found  Next physical within 3 mo: Visit date not found  Prescriber OR PCP: Cyril Kelly MD  Last diagnosis associated with med order: 1. Hyperlipemia  - simvastatin (ZOCOR) 20 MG tablet [Pharmacy Med Name: SIMVASTATIN 20 MG TABLET]; TAKE 1 TABLET BY MOUTH EVERY DAY  Dispense: 90 tablet; Refill: 1    If protocol passes may refill for 12 months if within 3 months of last provider visit (or a total of 15 months).

## 2021-05-29 NOTE — PROGRESS NOTES
[facility] Care Coordination Contact    [facility]  Disenrollment check-list completed, UTF emailed to Jose Angel Lares CMS informed.    Ismael Hanson RN, PHN   [facility]  [phone]

## 2021-05-30 VITALS — WEIGHT: 129.5 LBS | HEIGHT: 59 IN | BODY MASS INDEX: 26.11 KG/M2

## 2021-05-31 ENCOUNTER — RECORDS - HEALTHEAST (OUTPATIENT)
Dept: ADMINISTRATIVE | Facility: CLINIC | Age: 76
End: 2021-05-31

## 2021-05-31 VITALS — WEIGHT: 130.9 LBS | BODY MASS INDEX: 26.89 KG/M2

## 2021-05-31 VITALS — HEIGHT: 59 IN | BODY MASS INDEX: 26.53 KG/M2 | WEIGHT: 131.6 LBS

## 2021-05-31 NOTE — TELEPHONE ENCOUNTER
RN cannot approve Refill Request    RN can NOT refill this medication No established PCP. Last office visit: 11/2/2017 Cyril Kelly MD Last Physical: 5/7/2018 Last MTM visit: Visit date not found Last visit same specialty: 5/29/2019 Ehsan Elizabeth MD.  Next visit within 3 mo: Visit date not found  Next physical within 3 mo: Visit date not found      Florencia Santiago, Care Connection Triage/Med Refill 8/4/2019    Requested Prescriptions   Pending Prescriptions Disp Refills     mometasone (NASONEX) 50 mcg/actuation nasal spray [Pharmacy Med Name: MOMETASONE FUROATE 50 MCG SPRY]  1     Sig: INSTILL 2 SPRAYS INTO EACH NOSTIL DAILY AS NEEDED       Nasal Steroid Refill Protocol Passed - 8/4/2019  8:35 AM        Passed - Patient has had office visit/physical in last 2 years     Last office visit with prescriber/PCP: 11/2/2017 OR same dept: 5/29/2019 Ehsan Elizabeth MD OR same specialty: 5/29/2019 Ehsan Elizabeth MD Last physical: 5/7/2018 Last MTM visit: Visit date not found    Next appt within 3 mo: Visit date not found  Next physical within 3 mo: Visit date not found  Prescriber OR PCP: Cyril Kelly MD  Last diagnosis associated with med order: 1. Environmental allergies  - mometasone (NASONEX) 50 mcg/actuation nasal spray [Pharmacy Med Name: MOMETASONE FUROATE 50 MCG SPRY]; INSTILL 2 SPRAYS INTO EACH NOSTIL DAILY AS NEEDED; Refill: 1     If protocol passes may refill for 12 months if within 3 months of last provider visit (or a total of 15 months).

## 2021-06-01 ENCOUNTER — RECORDS - HEALTHEAST (OUTPATIENT)
Dept: ADMINISTRATIVE | Facility: CLINIC | Age: 76
End: 2021-06-01

## 2021-06-01 VITALS — HEIGHT: 59 IN | BODY MASS INDEX: 26.25 KG/M2 | WEIGHT: 130.2 LBS

## 2021-06-01 NOTE — TELEPHONE ENCOUNTER
Former patient of Robin & has not established care with another provider.  Please assign refill request to covering provider per Clinic standard process.      Refill Approved    Rx renewed per Medication Renewal Policy. Medication was last renewed on 8/5/19.    Phoebe Hurd, [facility] Connection Triage/Med Refill 9/13/2019     Requested Prescriptions   Pending Prescriptions Disp Refills     mometasone (NASONEX) 50 mcg/actuation nasal spray 17 g 0       Nasal Steroid Refill Protocol Passed - 9/13/2019 11:40 PM        Passed - Patient has had office visit/physical in last 2 years     Last office visit with prescriber/PCP: 11/2/2017 OR same dept: 5/29/2019 Ehsan Elizabeth MD OR same specialty: 5/29/2019 Ehsan Elizabeth MD Last physical: 5/7/2018 Last MTM visit: Visit date not found    Next appt within 3 mo: Visit date not found  Next physical within 3 mo: Visit date not found  Prescriber OR PCP: Cyril Kelly MD  Last diagnosis associated with med order: 1. Environmental allergies  - mometasone (NASONEX) 50 mcg/actuation nasal spray  Dispense: 17 g; Refill: 0     If protocol passes may refill for 12 months if within 3 months of last provider visit (or a total of 15 months).

## 2021-06-02 ENCOUNTER — COMMUNICATION - HEALTHEAST (OUTPATIENT)
Dept: INTERNAL MEDICINE | Facility: CLINIC | Age: 76
End: 2021-06-02

## 2021-06-02 ENCOUNTER — RECORDS - HEALTHEAST (OUTPATIENT)
Dept: ADMINISTRATIVE | Facility: CLINIC | Age: 76
End: 2021-06-02

## 2021-06-02 DIAGNOSIS — E78.5 HYPERLIPEMIA: ICD-10-CM

## 2021-06-02 NOTE — TELEPHONE ENCOUNTER
RN cannot approve Refill Request    RN can NOT refill this medication No established PCP. Last office visit: 11/2/2017 Cyril Kelly MD Last Physical: 5/7/2018 Last MTM visit: Visit date not found Last visit same specialty: 5/29/2019 Ehsan Elizabeth MD.  Next visit within 3 mo: Visit date not found  Next physical within 3 mo: Visit date not found      Florencia Santiago, Care Connection Triage/Med Refill 10/13/2019    Requested Prescriptions   Pending Prescriptions Disp Refills     mometasone (NASONEX) 50 mcg/actuation nasal spray 17 g 1       Nasal Steroid Refill Protocol Passed - 10/12/2019  4:22 PM        Passed - Patient has had office visit/physical in last 2 years     Last office visit with prescriber/PCP: 11/2/2017 OR same dept: 5/29/2019 Ehsan Elizabeth MD OR same specialty: 5/29/2019 Ehsan Elizabeth MD Last physical: 5/7/2018 Last MTM visit: Visit date not found    Next appt within 3 mo: Visit date not found  Next physical within 3 mo: Visit date not found  Prescriber OR PCP: Cyril Kelly MD  Last diagnosis associated with med order: 1. Environmental allergies  - mometasone (NASONEX) 50 mcg/actuation nasal spray  Dispense: 17 g; Refill: 1     If protocol passes may refill for 12 months if within 3 months of last provider visit (or a total of 15 months).

## 2021-06-02 NOTE — PATIENT INSTRUCTIONS - HE
1. No changes in medications.     2. No need for blood tests today     3. Schedule bone density testing.     4. Continue acetaminophen, tylenol, up to 4 or 5 pills per day if needed.     5. Follow up in 6 months.     6. OK to get second shingrix shot.       Advance Directive  Patient s advance directive was discussed and I am comfortable with the patient s wishes.  Patient Education   Personalized Prevention Plan  You are due for the preventive services outlined below.  Your care team is available to assist you in scheduling these services.  If you have already completed any of these items, please share that information with your care team to update in your medical record.  Health Maintenance   Topic Date Due     DEPRESSION FOLLOW UP  1945     ZOSTER VACCINES (2 of 3) 08/21/2013     TD 18+ HE  07/12/2016     DXA SCAN  01/12/2018     MEDICARE ANNUAL WELLNESS VISIT  05/07/2019     INFLUENZA VACCINE RULE BASED (1) 08/01/2019     FALL RISK ASSESSMENT  05/29/2020     ADVANCE CARE PLANNING  05/07/2023     PNEUMOCOCCAL IMMUNIZATION 65+ LOW/MEDIUM RISK  Completed

## 2021-06-02 NOTE — PROGRESS NOTES
Assessment and Plan:     1. Senile osteoporosis  Will follow with repeat DXA.  Continue calcium with vitamin D  - Bath Seat  - DXA Bone Density Scan; Future    2. Senescence  Referred for this DME.   - [facility]    3. Hyperlipidemia  Continue present simvastatin treatment.    4. Screening for colon cancer  Recent 2019 cologuard is negative.      The patient's current medical problems were reviewed.    I have had an Advance Directives discussion with the patient.  The following health maintenance schedule was reviewed with the patient and provided in printed form in the after visit summary:   Health Maintenance   Topic Date Due     DEPRESSION FOLLOW UP  1945     ZOSTER VACCINES (2 of 3) 2013     TD 18+ HE  2016     DXA SCAN  2018     MEDICARE ANNUAL WELLNESS VISIT  2019     INFLUENZA VACCINE RULE BASED (1) 2019     FALL RISK ASSESSMENT  2020     ADVANCE CARE PLANNING  2023     PNEUMOCOCCAL IMMUNIZATION 65+ LOW/MEDIUM RISK  Completed      Subjective:   Chief Complaint: Elkin Castaneda is an 74 y.o. female here for an Annual Wellness visit.   HPI: here for annual wellness.  She feels well overall.  Not having new problems with dyspnea, or chest pain or bowel or bladder symptoms.  No unusual cough.      Review of Systems: Please see above.  The rest of the review of systems are negative for all systems.    Patient Care Team:  Cyril Foss MD as PCP - General (Internal Medicine)    Patient Active Problem List   Diagnosis     Hyperlipidemia     Osteoarthritis     Osteopenia     Vitamin D deficiency     Anatomical narrow angle of eye     H/O laser iridotomy     Osteoporosis     History reviewed. No pertinent past medical history.   Past Surgical History:   Procedure Laterality Date      SECTION       CHOLECYSTECTOMY       HYSTERECTOMY        Family History   Problem Relation Age of Onset     No Medical Problems Mother      No Medical Problems Father      Breast  cancer Neg Hx       Social History     Socioeconomic History     Marital status: Single     Spouse name: Not on file     Number of children: Not on file     Years of education: Not on file     Highest education level: Not on file   Occupational History     Not on file   Social Needs     Financial resource strain: Not on file     Food insecurity:     Worry: Not on file     Inability: Not on file     Transportation needs:     Medical: Not on file     Non-medical: Not on file   Tobacco Use     Smoking status: Never Smoker     Smokeless tobacco: Never Used   Substance and Sexual Activity     Alcohol use: Not on file     Drug use: Not on file     Sexual activity: Not on file   Lifestyle     Physical activity:     Days per week: Not on file     Minutes per session: Not on file     Stress: Not on file   Relationships     Social connections:     Talks on phone: Not on file     Gets together: Not on file     Attends Hoahaoism service: Not on file     Active member of club or organization: Not on file     Attends meetings of clubs or organizations: Not on file     Relationship status: Not on file     Intimate partner violence:     Fear of current or ex partner: Not on file     Emotionally abused: Not on file     Physically abused: Not on file     Forced sexual activity: Not on file   Other Topics Concern     Not on file   Social History Narrative     Not on file      Current Outpatient Medications   Medication Sig Dispense Refill     cholecalciferol, vitamin D3, (VITAMIN D3) 2,000 unit capsule Take 1,000 Units by mouth daily.       mometasone (NASONEX) 50 mcg/actuation nasal spray INSTILL 2 SPRAYS INTO EACH NOSTIL DAILY AS NEEDED, Please schedule est care with new provider 17 g 0     mometasone (NASONEX) 50 mcg/actuation nasal spray INSTILL 2 SPRAYS INTO EACH NOSTIL DAILY AS NEEDED 17 g 1     simvastatin (ZOCOR) 20 MG tablet TAKE 1 TABLET BY MOUTH EVERY DAY 90 tablet 3       Objective:   Vital Signs:   Visit Vitals  /76  "(Patient Site: Left Arm, Patient Position: Sitting, Cuff Size: Adult Regular)   Pulse (!) 58   Ht 4' 10.5\" (1.486 m)   Wt 134 lb 3 oz (60.9 kg)   SpO2 99%   BMI 27.57 kg/m       PHYSICAL EXAM:  General Appearance: In no acute distress  /76 (Patient Site: Left Arm, Patient Position: Sitting, Cuff Size: Adult Regular)   Pulse (!) 58   Ht 4' 10.5\" (1.486 m)   Wt 134 lb 3 oz (60.9 kg)   SpO2 99%   BMI 27.57 kg/m    EYES: Clear, without inflammation, fundi are unremarkable   HEENT: Without congestion or inflammation  NECK:  without cervical or axillary adenopathy  RESPIRATORY: Clear   CARDIOVASCULAR: S1, S2   ABDOMEN: soft, flat, and non-tender, without mass, rebound, or guarding  EXTREMITIES: No joint swelling, or inflammation, no ulcer or edema  NEUROLOGIC: No arm or leg  weakness, speech is clear, gait is normal  PSYCHIATRIC: Oriented X 3, without confusion, behavior and affect normal, thinking is clear    Assessment Results 10/17/2019   Activities of Daily Living No help needed   Instrumental Activities of Daily Living 1 - Full function   Mini Cog Total Score 2   Some recent data might be hidden     A Mini-Cog score of 0-2 suggests the possibility of dementia, score of 3-5 suggests no dementia    Identified Health Risks:     The patient was provided with suggestions to help her develop a healthy physical lifestyle.   Patient's advanced directive was discussed and I am comfortable with the patient's wishes.  She plans to consider her issues going forward.     "

## 2021-06-03 VITALS
SYSTOLIC BLOOD PRESSURE: 114 MMHG | BODY MASS INDEX: 27.05 KG/M2 | HEIGHT: 59 IN | OXYGEN SATURATION: 99 % | WEIGHT: 134.19 LBS | HEART RATE: 58 BPM | DIASTOLIC BLOOD PRESSURE: 76 MMHG

## 2021-06-03 VITALS — WEIGHT: 133.56 LBS | HEIGHT: 59 IN | BODY MASS INDEX: 26.92 KG/M2

## 2021-06-03 RX ORDER — SIMVASTATIN 20 MG
TABLET ORAL
Qty: 90 TABLET | Refills: 1 | Status: SHIPPED | OUTPATIENT
Start: 2021-06-03 | End: 2022-03-18

## 2021-06-03 NOTE — PROGRESS NOTES
[facility] Care Coordination Contact    Member became effective with  [facility] on 11/1/19 with [facility].  Previous Health Plan: [employer]  Previous Care System: [facility]  Previous care coordinators name and number: GOPAL Lion Type: EW  Last MMIS Entry: Date 09/23/19 and Type 06  MMIS visit date if different from above: n/a  Services Listed in MMIS: 33 F CASE MGMT 04 O HOME MEALS 06 F HOMEMAKER  60 F LAUNDRY 03 F MA TRANS 24 F MH SERV  07 F MONEY MGT 50 F EMERG RSP 23 F SUPPLIES  45 F WVRAC HUA  UTF received: No: Requested on 11/12/19     Marcos Jimenez  Care Management Specialist  [facility]  [phone]

## 2021-06-03 NOTE — PROGRESS NOTES
[facility] Care Coordination Contact    Order placed with [facility] (p: [phone]; f: [phone]) for bath bench w/o back.  Order placed on 12/2/19. Access updated.  As required, authorization submitted to health [facility].    Marcos Jimenez  Care Management Specialist  [facility]  [phone]

## 2021-06-03 NOTE — PROGRESS NOTES
[facility] Care Coordination Contact    [facility] System Change (Transfer)    Member is new enrollee to [facility] effective 11/1/19 with [facility] [employer] [facility]. Member transferred from [facility].    No home visit required because this care coordinator (CC) has received all required documentation from the previous CC.    Writer t/c to member, introduced self as member's new CC. Confirmed with member that the welcome letter with writer's name and contact information has been received.  Reviewed LTCC/Health Risk Assessment (HRA) and POC with member. No changes noted.  Transitional HRA completed. Care Plan Summary updated and reflects current services.  Required referral authorization information communicated to CMS: Yes    MMIS entry completed by: Care Coordinator    Writer reviewed the following with member:    ER visits: No  Hospitalizations: No  TCU stays: No  Significant health status changes: No  Falls/Injuries: No  ADL/IADL changes: No  Changes in services: No    Follow-Up Plan: Member informed of future contact, plan to f/u with member with at next regularly scheduled contact.  Contact information shared with member, encouraged member to call with any questions or concerns.    Ismael Hanson RN, PHN   [facility]  [phone]

## 2021-06-05 VITALS
DIASTOLIC BLOOD PRESSURE: 70 MMHG | TEMPERATURE: 97.2 F | BODY MASS INDEX: 28.63 KG/M2 | WEIGHT: 142 LBS | OXYGEN SATURATION: 97 % | HEART RATE: 56 BPM | HEIGHT: 59 IN | SYSTOLIC BLOOD PRESSURE: 148 MMHG

## 2021-06-06 NOTE — PROGRESS NOTES
[facility] Care Coordination Contact    Per [employer], bath bench delivered.     Marcos Jimenez  Care Management Specialist  [facility]  [phone]

## 2021-06-07 NOTE — PROGRESS NOTES
[facility] Care Coordination Contact      [facility] Six-Month Telephone Assessment    6 month telephone assessment completed on 4/29/20 through English .    ER visits: No  Hospitalizations: No  TCU stays: No  Significant health status changes: no  Falls/Injuries: No  ADL/IADL changes: No  Changes in services: No    Caregiver Assessment follow up:  n/a    Goals: See POC in chart for goal progress documentation.      Will see member in 6 months for an annual health risk assessment.   Encouraged member to call CC with any questions or concerns in the meantime.     Ismael Hanson RN, PHN   [facility]  [phone]

## 2021-06-07 NOTE — TELEPHONE ENCOUNTER
Requested Prescriptions     Pending Prescriptions Disp Refills     mometasone (NASONEX) 50 mcg/actuation nasal spray [Pharmacy Med Name: MOMETASONE FUROATE 50 MCG SPRY] 17 g 1     Sig: INSTILL 2 SPRAYS INTO EACH NOSTIL DAILY AS NEEDED         Last Appt: 10/17/19  Next Appt:  unknown  90 day supply pended

## 2021-06-08 NOTE — PROGRESS NOTES
[facility] Daily Progress     Patient Name: Elkin Castaneda  Date: 1/6/2017  Visit #: 8  Referral Diagnosis: Lateral epicondylitis  Referring provider: Raudel Loyd MD  Visit Diagnosis:     ICD-10-CM    1. Pain in joint, forearm, right M25.531    2. Weakness of right arm M62.81    3. Decreased activities of daily living (ADL) Z78.9        Assessment:     Patient demonstrates understanding/independence with home program. D/C OT.     Goal Status: Goals met  Patient will perform hand functions of: gripping;holding;pinching;buttoning/dressing;for lifting;for carrying;for writing;with less pain;in 12 weeks  Pt will: be able to prepare meal with less pain in 12 weeks  Pt will: be able to perform IADL's with less pain in 12 weeks    Plan / Patient Education:     D/C OT.     Subjective:     Pain rating at rest: 0  Pain rating with activity: 1   Patient reports that she only has pain when lifting something heavy. The pain is a dull ache when that occurs.    Objective:     Treatment Today: Reviewed HEP. Measurements are as follows today.     Right Upper Extremity 12-1-16 1-6-17   Wrist extension 49 61    Strength 14# 29#   3 Point Pinch 7# 11#   Lateral Pinch 10# 13#       TREATMENT MINUTES COMMENTS   Evaluation     Self-care/ Home management     Manual therapy 10    Neuromuscular Re-education     Therapeutic Exercises     Iontophoresis          Total 10    Blank areas are intentional and mean the treatment did not include these items.       Michelle Caro  1/6/2017

## 2021-06-08 NOTE — TELEPHONE ENCOUNTER
Refill Approved    Rx renewed per Medication Renewal Policy. Medication was last renewed on 6/18/19.    Tamika Ashton, [facility] Triage/Med Refill 6/9/2020     Requested Prescriptions   Pending Prescriptions Disp Refills     simvastatin (ZOCOR) 20 MG tablet [Pharmacy Med Name: SIMVASTATIN 20 MG TABLET] 90 tablet 3     Sig: TAKE 1 TABLET BY MOUTH EVERY DAY       Statins Refill Protocol (Hmg CoA Reductase Inhibitors) Passed - 6/5/2020  8:17 AM        Passed - PCP or prescribing provider visit in past 12 months      Last office visit with prescriber/PCP: Visit date not found OR same dept: Visit date not found OR same specialty: 5/29/2019 Ehsan Elizabeth MD  Last physical: 10/17/2019 Last MTM visit: Visit date not found   Next visit within 3 mo: Visit date not found  Next physical within 3 mo: Visit date not found  Prescriber OR PCP: Cyril Foss MD  Last diagnosis associated with med order: 1. Hyperlipemia  - simvastatin (ZOCOR) 20 MG tablet [Pharmacy Med Name: SIMVASTATIN 20 MG TABLET]; TAKE 1 TABLET BY MOUTH EVERY DAY  Dispense: 90 tablet; Refill: 3    If protocol passes may refill for 12 months if within 3 months of last provider visit (or a total of 15 months).

## 2021-06-08 NOTE — PROGRESS NOTES
[facility] Daily Progress     Patient Name: Elkin Castaneda  Date: 1/3/2017  Visit #: 7  Referral Diagnosis: Lateral epicondylitis  Referring provider: Raudel Loyd MD  Visit Diagnosis:     ICD-10-CM    1. Pain in joint, forearm, right M25.531    2. Weakness of right arm M62.81    3. Decreased activities of daily living (ADL) Z78.9        Assessment:     Patient is appropriate to continue with skilled occupational therapy intervention, as indicated by initial plan of care.     Goal Status:  Patient will perform hand functions of: gripping;holding;pinching;buttoning/dressing;for lifting;for carrying;for writing;with less pain;in 12 weeks  Pt will: be able to prepare meal with less pain in 12 weeks  Pt will: be able to perform IADL's with less pain in 12 weeks    Plan / Patient Education:     Progress with home program as tolerated.     Subjective:     Pain rating at rest: 0  Pain rating with activity: 1    Objective:     Treatment Today: Reviewed HEP. Measurements are as follows today.     Right Upper Extremity 12-1-16 1-3-17   Wrist extension 49 61    Strength 14# 22#   3 Point Pinch 7# 9.5#   Lateral Pinch 10# 13#       TREATMENT MINUTES COMMENTS   Evaluation     Self-care/ Home management     Manual therapy     Neuromuscular Re-education     Therapeutic Exercises 15    Iontophoresis          Total 15    Blank areas are intentional and mean the treatment did not include these items.       Michelle Caro  1/3/2017

## 2021-06-08 NOTE — TELEPHONE ENCOUNTER
Refill Approved    Rx renewed per Medication Renewal Policy. Medication was last renewed on 4/29/20.    Phoebe Hurd, [facility] Connection Triage/Med Refill 5/27/2020     Requested Prescriptions   Pending Prescriptions Disp Refills     mometasone (NASONEX) 50 mcg/actuation nasal spray [Pharmacy Med Name: MOMETASONE FUROATE 50 MCG SPRY] 17 g 1     Sig: INSTILL 2 SPRAYS INTO EACH NOSTIL DAILY AS NEEDED       Nasal Steroid Refill Protocol Passed - 5/24/2020  8:32 AM        Passed - Patient has had office visit/physical in last 2 years     Last office visit with prescriber/PCP: Visit date not found OR same dept: 5/29/2019 Ehsan Elizabeth MD OR same specialty: 5/29/2019 Ehsan Elizabeth MD Last physical: 10/17/2019 Last MTM visit: Visit date not found    Next appt within 3 mo: Visit date not found  Next physical within 3 mo: Visit date not found  Prescriber OR PCP: Cyril Foss MD  Last diagnosis associated with med order: 1. Environmental allergies  - mometasone (NASONEX) 50 mcg/actuation nasal spray [Pharmacy Med Name: MOMETASONE FUROATE 50 MCG SPRY]; INSTILL 2 SPRAYS INTO EACH NOSTIL DAILY AS NEEDED  Dispense: 17 g; Refill: 1     If protocol passes may refill for 12 months if within 3 months of last provider visit (or a total of 15 months).

## 2021-06-09 NOTE — PROGRESS NOTES
[facility] Care Coordination Contact    Received voice message from Sonia at [facility] RE: homemaking services auth ended 4/30/20. Previous CC completed [facility] in 11/2019 but auth was not put in for 1 yr.     Task sent to CMS to put in auth for 5/1/20 to 9/30/20.    Ismael Hanson RN, PHN   [facility]  [phone]

## 2021-06-09 NOTE — PROGRESS NOTES
[facility] Care Coordination Contact    Submitted homemaking auth to [employer] and provider.     Marcos Jimenez  Care Management Specialist  [facility]  [phone]

## 2021-06-09 NOTE — PROGRESS NOTES
ASSESSMENT:  1. Acute bronchitis, unspecified organism  Elkin Castaneda is a 71-year-old woman who presents for 3 weeks of cough, sinus congestion, shortness of breath, but no fevers.  Lung exam normal.  Symptoms attributed to bronchitis, sinusitis.  Combination azithromycin and prednisone prescribed as well as Cheratussin for cough suppressant.  Should be improvement within 1 week, advised to call if not.  - predniSONE (DELTASONE) 20 MG tablet; Take 1 tablet (20 mg total) by mouth daily for 7 days.  Dispense: 7 tablet; Refill: 0  - azithromycin (ZITHROMAX) 250 MG tablet; Take 500mg (2 tablets) day one, and then 250mg (1 tablet) days 2-5  Dispense: 6 tablet; Refill: 0  - codeine-guaiFENesin (GUAIFENESIN AC)  mg/5 mL liquid; Take 5 mL by mouth 3 (three) times a day as needed for cough.  Dispense: 120 mL; Refill: 0        PLAN:  Patient Instructions   Acute bronchitis, upper respiratory infection    Azithromycin (antibiotic)     Prednisone antiinflammatory daily    Cough syrup three times daily as needed     Call if not improving in 1 week       No orders of the defined types were placed in this encounter.    Medications Discontinued During This Encounter   Medication Reason     codeine-guaiFENesin (GUAIFENESIN AC)  mg/5 mL liquid Reorder       No Follow-up on file.    CHIEF COMPLAINT:  Chief Complaint   Patient presents with     Cough     x 3 weeks       HISTORY OF PRESENT ILLNESS:  Elkin is a 71 y.o. female patient of Dr. Loyd presenting to the clinic today for a cough. She is accompanied by an  to the clinic today. The cough started three weeks ago. She mentions that the cough has been productive; she has been coughing up yellow colored discharge. She has noticed some difficulties breathing with the cough. She denies any fever or chills. She has also had nasal congestion and a runny nose. She has not had any sinus or facial pain. She has tried cough syrup and Tessalon pearls; neither have  "been effective. She will develop a sore throat if she coughs a lot. She has been tired most of the time. She has not had difficulties falling asleep due to the cough.     REVIEW OF SYSTEMS:   All other systems are negative.    PFSH:  Reviewed as above.     TOBACCO USE:  History   Smoking Status     Never Smoker   Smokeless Tobacco     Not on file       VITALS:  Vitals:    02/23/17 1114   BP: 98/58   Pulse: 69   Temp: 97.7  F (36.5  C)   SpO2: 97%   Weight: 129 lb 8 oz (58.7 kg)   Height: 4' 10.5\" (1.486 m)     Wt Readings from Last 3 Encounters:   02/23/17 129 lb 8 oz (58.7 kg)   11/22/16 130 lb 4 oz (59.1 kg)   01/04/16 124 lb (56.2 kg)     Body mass index is 26.6 kg/(m^2).    PHYSICAL EXAM:  General: Alert, pleasant female.   Lungs: Clear to auscultation bilateral, good air movement. Coughing frequently.   Heart: Regular rate and rhythm, S1 and S2 normal, no murmur.     ADDITIONAL HISTORY SUMMARIZED (2): Reviewed physical exam note from Raudel Loyd MD dated January 4, 2016  DECISION TO OBTAIN EXTRA INFORMATION (1): None.   RADIOLOGY TESTS (1): None.  LABS (1): None.  MEDICINE TESTS (1): None.  INDEPENDENT REVIEW (2 each): None.     The visit lasted a total of 11 minutes face to face with the patient. Over 50% of the time was spent counseling and educating the patient about cough.    Denise PEREZ, am scribing for and in the presence of, Dr. Henning.    Dr. Juvencio PEREZ, personally performed the services described in this documentation, as scribed by Denise Tidwell in my presence, and it is both accurate and complete.    MEDICATIONS:  Current Outpatient Prescriptions   Medication Sig Dispense Refill     alendronate (FOSAMAX) 35 MG tablet TAKE 1 TABLET BY MOUTH WEEKLY 4 tablet 0     benzonatate (TESSALON) 200 MG capsule Take 1 capsule (200 mg total) by mouth 3 (three) times a day as needed for cough. 30 capsule 0     dexamethasone (DECADRON) 4 mg/mL injection Use this for iontophoresis on right elbow 5 mL 0 "     mometasone (NASONEX) 50 mcg/actuation nasal spray 2 sprays into each nostril daily as needed. 17 g 11     simvastatin (ZOCOR) 20 MG tablet TAKE 1 TABLET BY MOUTH DAILY 90 tablet 0     azithromycin (ZITHROMAX) 250 MG tablet Take 500mg (2 tablets) day one, and then 250mg (1 tablet) days 2-5 6 tablet 0     codeine-guaiFENesin (GUAIFENESIN AC)  mg/5 mL liquid Take 5 mL by mouth 3 (three) times a day as needed for cough. 120 mL 0     predniSONE (DELTASONE) 20 MG tablet Take 1 tablet (20 mg total) by mouth daily for 7 days. 7 tablet 0     No current facility-administered medications for this visit.        Total data points: None.

## 2021-06-10 NOTE — PROGRESS NOTES
"[facility] Care Coordination Contact  [facility] Home Visit Assessment     Health Risk Assessment with Elkin Castaneda completed on 8/19/2020 via telephone due to COVID-19 restrictions.      Type of residence:: Apartment - handicap accessible  Current living arrangement:: I live alone     Assessment completed with:: Patient, Care Team Member, Other([place]  ([place] ID # [id number]))    Current Care Plan  Member currently receiving the following home care services:     Member currently receiving the following community resources: County Worker, Day Care, Housekeeping/Chore Agency, [employer]      Medication Review  Medication reconciliation completed in Epic: IF NO, PLEASE EXPLAIN mbr unable to provide dosage. Reports taking \"cholesterol medication, calcium and Vit D\".   Medication set-up & administration: Independent and sets up on own weekly  Self-administers medications  Medication Risk Assessment Medication (1 or more, place referral to MTM):  N/A: No risk factors identified  MTM Referral Placed: No: No risk factors idenified    Mental/Behavioral Health   Depression Screening:    PHQ-2 Total Score: 0       Mental health DX:: No        Falls Assessment:   Fallen 2 or more times in the past year?: No   Any fall with injury in the past year?: No    ADL/IADL Dependencies:   Dependent ADLs:: Independent(reports only using rolling walker when out of the home otherwise she does not use it while at home)  Dependent IADLs:: Cleaning, Cooking, Laundry, Shopping, Meal Preparation, Transportation    [facility] Plan sponsored benefits: Shared information re: Silver Sneakers/gym memberships, ASA, Calcium +D.    PCA Assessment completed at visit: No     Elderly Waiver Eligibility: Yes - will continue on EW    Care Plan & Recommendations: Member will continue to reside in her own home safety and independently with homemaking services. She was attending adult day care up to COVID-19 restrictions put in place. " adult day care has reopened but she is not comfortable going yet. We will continue auth for adult day care and she will resume once she feels comfortable going again. Physical exam and cholesterol check are due in the next couple of months. She will contact CC if she needs me to assist with scheduling and arrange transportation to clinic. She has been having tooth discomfort and will see the dentist. Dental appt was delayed due to COVID-19 per member.    See CC for detailed assessment information.    Follow-Up Plan: Member informed of future contact, plan to f/u with member with a 6 month telephone assessment.  Contact information shared with member and family, encouraged member to call with any questions or concerns at any time.    Ismael Hanson RN, PHN   [facility]  [phone]

## 2021-06-11 NOTE — PROGRESS NOTES
[facility] Care Coordination Contact    Received after visit chart from care coordinator.  Completed following tasks: Mailed copy of care plan to client, Updated services in access and Submitted referrals/auths for homemaking and adult day care    and Provider Signature - No POC Shared:  Member indicates that they do not want their POC shared with any EW providers.     Mailed POC signature page to member to sign and return asap.    Marcos Jimenez  Care Management Specialist  [facility]  [phone]

## 2021-06-12 NOTE — PROGRESS NOTES
[facility] Care Coordination Contact    Attempted to reach member, no answer and left  voice message.    Called Bianca Lenz at [facility], not available and left  voice message.    Ismael Hanson RN, PHN   [facility]  [phone]

## 2021-06-12 NOTE — TELEPHONE ENCOUNTER
----- Message from Cara Viera PA-C sent at 10/26/2020  7:09 PM CDT -----  Her vitamin D level is low.  Is she taking a vitamin D pill?  If not, I can send her one.  Cholesterol, kidney tests, thyroid hormone, hemoglobin all normal.

## 2021-06-12 NOTE — PROGRESS NOTES
[facility] Care Coordination Contact    Called member and she confirmed switching adult day care. Current adult day care is [facility]. Will submit new SA to health plan.    Ismael Hanson RN, PHN   [facility]  [phone]

## 2021-06-12 NOTE — TELEPHONE ENCOUNTER
Attempted to call patient. Phone number kept on dropping. Tried 2x to get patient. Will try again later.     Use  line to contact :Rosario  ID:[id number]

## 2021-06-12 NOTE — PROGRESS NOTES
[facility] Care Coordination Contact    Received  voice mail from Bianca Lenz at [facility] that member had switched to their adult day care effective 9/29/20.    Attempted to reach member, not available and left  voice mail.    Ismael Hanson RN, PHN   [facility]  [phone]

## 2021-06-12 NOTE — TELEPHONE ENCOUNTER
Relayed message to patient via language line  [facility] ID# [id number]. She verbalized understanding of plan. Completing task.

## 2021-06-12 NOTE — PROGRESS NOTES
Assessment and Plan:   1. Routine general medical examination at a health care facility  Annual wellness visit completed.  I will follow-up with her screening lab work.  She is up-to-date with other health maintenance.  - Lipid [place] FASTING  - Basic Metabolic Panel    2. Fatigue, unspecified type  Likely multifactorial.  No acute concerns today.  I will follow-up with screening lab work.  I think fatigue is certainly into influenza by her mood, and her lack of physical activity, which is difficult during pandemic.  She declines EKG today.  Previous EKG from 2017 was grossly normal.  See below for details.  - Thyroid [place]  - Hemoglobin    3. Vitamin D deficiency  History of vitamin D deficiency.  Unclear if patient is taking vitamin D right now.  I will follow-up with results.  - Vitamin D, Total (25-Hydroxy)    4. Adjustment disorder, unspecified type  Depression and fatigue.  Certainly worsened by current pandemic and inability to do normal activities and see family as normal.  Family is able to visit her at home, and speak with her over the phone.  She declines referral for therapy today.  She does contract for safety.    5. Elevated blood pressure  Systolic blood pressure minimally elevated today.  In reviewing previous blood pressures they have all been normal.  Continue to monitor this.  We are planning on having her follow-up with PCP in about 4 weeks for check of her mood.  Could also recheck blood pressure at that time.      The patient's current medical problems were reviewed.    The following high BMI interventions were performed this visit: encouragement to exercise  The following health maintenance schedule was reviewed with the patient and provided in printed form in the after visit summary:   Health Maintenance   Topic Date Due     DEPRESSION ACTION PLAN  1945     MEDICARE ANNUAL WELLNESS VISIT  10/20/2021     FALL RISK ASSESSMENT  10/20/2021     DXA SCAN  10/29/2021     LIPID  05/07/2023  "    ADVANCE CARE PLANNING  10/17/2024     TD 18+ HE  2029     Pneumococcal Vaccine: 65+ Years  Completed     INFLUENZA VACCINE RULE BASED  Completed     ZOSTER VACCINES  Completed     Pneumococcal Vaccine: Pediatrics (0 to 5 Years) and At-Risk Patients (6 to 64 Years)  Aged Out     HEPATITIS B VACCINES  Aged Out        Subjective:   Chief Complaint: Elkin Castaneda is an 75 y.o. female here for a Welcome to Medicare visit.   HPI:    She would like her cholesterol checked.  She says she is \"worried a lot\" about her cholesterol.  She has had some fatigue and headaches recently.  She has noticed that her blood pressures, though still normal, are little higher when she checks it at home than they have been in the past.  She has not been eating a lot more salt in her diet.  She lives alone, but has family visited help her with some ADLs.    She has an eye appointment scheduled for December.  Occasional upper chest pain, notes occasional tiredness when walking upstairs.  She feels like she has some depression and anxiety, worsened recently.  Seems that a lot of this is due to pandemic and that she has to stay at home most of the time.  She has not really been getting out to do her normal activities.  She notes she does have smoke detectors in her house.    Review of Systems:   Please see above.  The rest of the review of systems are negative for all systems.    Patient Care Team:  Cyril Foss MD as PCP - General (Internal Medicine)  Cyril Foss MD as Assigned PCP  Ismael Hanson RN as Lead Care Coordinator (Primary Care - CC)     Patient Active Problem List   Diagnosis     Hyperlipidemia     Osteoarthritis     Osteopenia     Vitamin D deficiency     Anatomical narrow angle of eye     H/O laser iridotomy     Osteoporosis     No past medical history on file.   Past Surgical History:   Procedure Laterality Date      SECTION       CHOLECYSTECTOMY       HYSTERECTOMY        Family History   Problem " Relation Age of Onset     No Medical Problems Mother      No Medical Problems Father      Breast cancer Neg Hx       Social History     Socioeconomic History     Marital status: Single     Spouse name: Not on file     Number of children: Not on file     Years of education: Not on file     Highest education level: Not on file   Occupational History     Not on file   Social Needs     Financial resource strain: Not on file     Food insecurity     Worry: Not on file     Inability: Not on file     Transportation needs     Medical: Not on file     Non-medical: Not on file   Tobacco Use     Smoking status: Never Smoker     Smokeless tobacco: Never Used   Substance and Sexual Activity     Alcohol use: Not on file     Drug use: Not on file     Sexual activity: Not on file   Lifestyle     Physical activity     Days per week: Not on file     Minutes per session: Not on file     Stress: Not on file   Relationships     Social connections     Talks on phone: Not on file     Gets together: Not on file     Attends Sikhism service: Not on file     Active member of club or organization: Not on file     Attends meetings of clubs or organizations: Not on file     Relationship status: Not on file     Intimate partner violence     Fear of current or ex partner: Not on file     Emotionally abused: Not on file     Physically abused: Not on file     Forced sexual activity: Not on file   Other Topics Concern     Not on file   Social History Narrative     Not on file       Current Outpatient Medications   Medication Sig Dispense Refill     cholecalciferol, vitamin D3, (VITAMIN D3) 2,000 unit capsule Take 1,000 Units by mouth daily.       mometasone (NASONEX) 50 mcg/actuation nasal spray INSTILL 2 SPRAYS INTO EACH NOSTIL DAILY AS NEEDED 17 g 5     simvastatin (ZOCOR) 20 MG tablet TAKE 1 TABLET BY MOUTH EVERY DAY 90 tablet 1     No current facility-administered medications for this visit.       Objective:   Vital Signs:   Vitals:    10/20/20  "0759 10/20/20 0848   BP: 154/74 148/70   Patient Site: Left Arm Left Arm   Patient Position: Sitting Sitting   Cuff Size: Adult Regular Adult Regular   Pulse: (!) 59 (!) 56   Temp: 97.2  F (36.2  C)    TempSrc: Temporal    SpO2: 97%    Weight: 142 lb (64.4 kg)    Height: 4' 10.5\" (1.486 m)          VisionScreening:   Hearing Screening    125Hz 250Hz 500Hz 1000Hz 2000Hz 3000Hz 4000Hz 6000Hz 8000Hz   Right ear:            Left ear:               Visual Acuity Screening    Right eye Left eye Both eyes   Without correction:      With correction: 10/20 10/25         PHYSICAL EXAM  Vital signs reviewed  General:  Patient is alert and oriented ×3, in no apparent distress  Head:  Normocephalic, without obvious abnormality, atraumatic  Eyes:  PERRLA laterally, conjunctiva clear, EOMs intact bilaterally  Ears:  Normal tympanic membranes and external ear canals  Neck:  No adenopathy, normal ROM  Cardiac:  Regular rate and rhythm, normal S1 and S2, no murmur, rub, or gallop  Lungs:  Clear to auscultation bilaterally, no crackles, rales, rhonchi, or wheezing noted, respirations unlabored  Abdomen:  Soft, nontender, normal bowel sounds, no masses, no organomegaly  Musculoskeletal:  Extremities normal, atraumatic, normal range of motion of all extremities, normal range of motion of back, patient walks a normal tandem gait  Skin:  Skin color, texture, turgor, normal, no rashes or lesions noted on exposed skin  Lymph nodes:  No cervical lymph nodes normal bilaterally  Neurologic:  Cranial nerves II through XII grossly intact, DTRs normal and symmetric bilaterally in lower extremities    Little interest or pleasure in doing things: Nearly every day  Feeling down, depressed, or hopeless: Several days  Trouble falling or staying asleep, or sleeping too much: Several days  Feeling tired or having little energy: Nearly every day  Poor appetite or overeating: Not at all  Feeling bad about yourself - or that you are a failure or have let " yourself or your family down: Not at all  Trouble concentrating on things, such as reading the newspaper or watching television: Not at all  Moving or speaking so slowly that other people could have noticed. Or the opposite - being so fidgety or restless that you have been moving around a lot more than usual: Several days      Assessment Results 10/20/2020   Activities of Daily Living No help needed   Instrumental Activities of Daily Living 5-6 - Severe functional impairment   Get Up and Go Score 12 seconds or more   Mini Cog Total Score 3   Some recent data might be hidden     A Mini Cog score of 0-2 suggests the possibility of dementia, score of 3-5 suggests no dementia    Identified Health Risks:     She is at risk for lack of exercise and has been provided with information to increase physical activity for the benefit of her well-being.  The patient reports that she does not have all recommended working emergency equipment available. She was provided with information about emergency preparedness, including smoke detectors.  The patient reports that she has difficulty with instrumental activities of daily living.  She was provided with a list of local organizations that provide support services and advised to make a follow up appointment in 4 weeks to address this further.   The patient s PHQ-9 score is consistent with moderate depression.  She was provided with information regarding depression and was advised to schedule a follow up appointment in 4 weeks to further address this issue.  She is at risk for falling and has been provided with information to reduce the risk of falling at home.  Information regarding advance directives (living alcantar), including where she can download the appropriate form, was provided to the patient via the AVS.

## 2021-06-13 NOTE — TELEPHONE ENCOUNTER
RN cannot approve Refill Request    RN can NOT refill this medication PCP messaged that patient is overdue for Office Visit. Last office visit: Visit date not found Last Physical: 10/17/2019 Last MTM visit: Visit date not found Last visit same specialty: 5/29/2019 Ehsan Elizabeth MD.  Next visit within 3 mo: Visit date not found  Next physical within 3 mo: Visit date not found      Florencia Santiago, Care Connection Triage/Med Refill 12/13/2020    Requested Prescriptions   Pending Prescriptions Disp Refills     simvastatin (ZOCOR) 20 MG tablet [Pharmacy Med Name: SIMVASTATIN 20 MG TABLET] 90 tablet 1     Sig: TAKE 1 TABLET BY MOUTH EVERY DAY       Statins Refill Protocol (Hmg CoA Reductase Inhibitors) Failed - 12/13/2020 11:51 AM        Failed - PCP or prescribing provider visit in past 12 months      Last office visit with prescriber/PCP: Visit date not found OR same dept: Visit date not found OR same specialty: 5/29/2019 Ehsan Elizabeth MD  Last physical: 10/17/2019 Last MTM visit: Visit date not found   Next visit within 3 mo: Visit date not found  Next physical within 3 mo: Visit date not found  Prescriber OR PCP: Cyril Foss MD  Last diagnosis associated with med order: 1. Hyperlipemia  - simvastatin (ZOCOR) 20 MG tablet [Pharmacy Med Name: SIMVASTATIN 20 MG TABLET]; TAKE 1 TABLET BY MOUTH EVERY DAY  Dispense: 90 tablet; Refill: 1    If protocol passes may refill for 12 months if within 3 months of last provider visit (or a total of 15 months).

## 2021-06-13 NOTE — PROGRESS NOTES
[facility] Care Coordination Contact    Completed following tasks: Updated services in access and Submitted referrals/auths for adult day care     Provider Signature - No POC Shared:  Member indicates that they do not want their POC shared with any EW providers. and Member Signature - POC Change:  Per CC, member has made a change to their POC.  Care Plan Change Letter mailed to member for signature with a self-addressed return envelope.     Marcos Jimenez  Care Management Specialist  [facility]  [phone]

## 2021-06-13 NOTE — PROGRESS NOTES
Elkin Castaneda  1945      Assessment and Plan:  1. Dyspnea.weakness/? Psychosocial factors, seems like chronic depressive. Perhaps socially isolated  2. Studies posted below; see letter to patient ; same meds  3. RTC 6 weeks; routine recheck- new patient, transferring from Dr. Loyd  4. See letter below/objective findings all negative including EKG, chest x-ray, labs and d-dimer.  No evidence of anemia.      Chief Complaint: multiple; weakness/fatigue/    Visit diagnoses:    1. Dyspnea  HM2(CBC w/o Differential)    Comprehensive Metabolic Panel    D-dimer, Quantitative    XR Chest PA and Lateral    Electrocardiogram Perform - Clinic   2. Hyperlipemia  Thyroid Stimulating Hormone (TSH)   3. Fatigue     4. Vitamin D deficiency     5. CHF (congestive heart failure)  BNP(B-type Natriuretic Peptide)     Patient Active Problem List   Diagnosis     Hyperlipidemia     Osteoarthritis     Osteopenia     Vitamin D deficiency     No past medical history on file.  Past Surgical History:   Procedure Laterality Date     HYSTERECTOMY       ME  DELIVERY ONLY      Description:  Section;  Recorded: 2008;  Comments: boy     ME REMOVAL GALLBLADDER      Description: Cholecystectomy;  Recorded: 2008;     Social History     Social History     Marital status: Single     Spouse name: N/A     Number of children: N/A     Years of education: N/A     Occupational History     Not on file.     Social History Main Topics     Smoking status: Never Smoker     Smokeless tobacco: Not on file     Alcohol use Not on file     Drug use: Not on file     Sexual activity: Not on file     Other Topics Concern     Not on file     Social History Narrative     Family History   Problem Relation Age of Onset     Breast cancer Neg Hx        Meds:  Current Outpatient Prescriptions   Medication Sig Dispense Refill     alendronate (FOSAMAX) 35 MG tablet TAKE 1 TABLET BY MOUTH WEEKLY 4 tablet 9     mometasone (NASONEX) 50 mcg/actuation  "nasal spray 2 sprays into each nostril daily as needed. 17 g 11     simvastatin (ZOCOR) 20 MG tablet TAKE 1 TABLET DAILY 90 tablet 1     No current facility-administered medications for this visit.        Allergies   Allergen Reactions     No Known Drug Allergies        ROS: complete review of symptoms otherwise negative except as noted below    S: She is here with an .  She lives alone.  At first I understood that she had no children but apparently she has 4 but she still seems somewhat socially isolated.  I asked to call her daughter with an update but she thought best I just called the .  She has a flat depressive type affect even considering cultural variation.  She has shortness of breath with activity going on for several months.  No fever chills chest pains or other symptoms.  She has fatigue and lack of interest in things.  All this is obtained through the  which was not so easy.  She has seen Dr. Loyd in the past and needs to reestablish her primary care.       O:   Vitals:    11/02/17 1004 11/02/17 1010   BP: 104/62    Patient Site: Left Arm    Patient Position: Sitting    Cuff Size: Adult Regular    Pulse: 60    SpO2: 97% 98%   Weight: 131 lb 9.6 oz (59.7 kg)    Height: 4' 10.5\" (1.486 m)        Physical Exam:  General-chronic depressive type affect does not appear acutely ill she is very polite and deferential  VS- see above  HEENT- neg   Neck- no adenopathy/thyromegaly/bruits  Chest- clear   Cor- reg no murmurs/gallops/ectopics  Extremities: no edema, good distal pulses  Neuro- Cr. NN-  intact, alert,   Abdomen- soft non tender, no masses; no organomegaly        Ordering/Authorizing: Cyril Kelly MD   Study Result   XR CHEST PA AND LATERAL  11/2/2017 10:57 AM     INDICATION: Dyspnea.  COMPARISON: None.     FINDINGS: Minimal basilar atelectasis or scarring. No consolidation. No pleural effusion or pneumothorax. Upper normal heart size. Mildly tortuous aorta with " atherosclerosis. Questionable small right calcified perihilar lymph node. Right upper quadrant   surgical clips.     This report was electronically interpreted by: Dr. Beau Santos DO ON 11/02/2017 at 12:00         Cyril Kelly MD    Recent Results (from the past 240 hour(s))   Electrocardiogram Perform - Clinic   Result Value Ref Range    SYSTOLIC BLOOD PRESSURE  mmHg    DIASTOLIC BLOOD PRESSURE  mmHg    VENTRICULAR RATE 60 BPM    ATRIAL RATE 60 BPM    P-R INTERVAL 152 ms    QRS DURATION 92 ms    Q-T INTERVAL 420 ms    QTC CALCULATION (BEZET) 420 ms    P Axis 61 degrees    R AXIS 42 degrees    T AXIS 19 degrees    MUSE DIAGNOSIS       Normal sinus rhythm  Possible Left atrial enlargement  Borderline ECG  No previous ECGs available     HM2(CBC w/o Differential)   Result Value Ref Range    WBC 4.6 4.0 - 11.0 thou/uL    RBC 4.25 3.80 - 5.40 mill/uL    Hemoglobin 13.1 12.0 - 16.0 g/dL    Hematocrit 38.8 35.0 - 47.0 %    MCV 91 80 - 100 fL    MCH 30.8 27.0 - 34.0 pg    MCHC 33.7 32.0 - 36.0 g/dL    RDW 11.9 11.0 - 14.5 %    Platelets 190 140 - 440 thou/uL    MPV 7.9 7.0 - 10.0 fL   Comprehensive Metabolic Panel   Result Value Ref Range    Sodium 142 136 - 145 mmol/L    Potassium 4.1 3.5 - 5.0 mmol/L    Chloride 106 98 - 107 mmol/L    CO2 26 22 - 31 mmol/L    Anion Gap, Calculation 10 5 - 18 mmol/L    Glucose 92 70 - 125 mg/dL    BUN 21 8 - 28 mg/dL    Creatinine 0.80 0.60 - 1.10 mg/dL    GFR MDRD Af Amer >60 >60 mL/min/1.73m2    GFR MDRD Non Af Amer >60 >60 mL/min/1.73m2    Bilirubin, Total 0.5 0.0 - 1.0 mg/dL    Calcium 9.8 8.5 - 10.5 mg/dL    Protein, Total 7.8 6.0 - 8.0 g/dL    Albumin 4.0 3.5 - 5.0 g/dL    Alkaline Phosphatase 62 45 - 120 U/L    AST 23 0 - 40 U/L    ALT 18 0 - 45 U/L   D-dimer, Quantitative   Result Value Ref Range    D-Dimer, Quant 0.33 <=0.50 FEU ug/mL   Thyroid Stimulating Hormone (TSH)   Result Value Ref Range    TSH 1.57 0.30 - 5.00 uIU/mL       Eklin, your extensive blood tests, chest  xray and ekg were all essentially normal and this is reassuring. NO evident heart or lung or other problem explaining your symptoms. If you want to make an appt to see me to discuss normal results that is fine.     Please call with questions or contact us using [email or web address].    Sincerely,        Electronically signed by MD Cyril Quinonez MD   Total time for visit 40 minutes greater than than 50% counseling, working with the , reviewing expectations, deciphering symptoms concerns etc., reviewing rationale for testing which turned out to be all negative

## 2021-06-15 NOTE — PROGRESS NOTES
[facility] Care Coordination    Called member to complete six month assessment, not available and did not leave  voice mail. Will call member again on a different day.    Received call from [facility], adult day care staff, requesting  for transportation to/from adult day care. Informed him we will resubmit info to [facility].    Ismael Hanson RN, PHN   [facility]  [phone]

## 2021-06-15 NOTE — PROGRESS NOTES
[facility] Clinic Note    Elkin Castaneda   72 y.o. female    Date of Visit: 1/9/2018  Chief Complaint   Patient presents with     Travel Consult     Will going to [facility] 1/11/18     Medication Refill     antibiotic, and benzonate        ASSESSMENT/PLAN  1. Medication management     2. Hyperlipemia  simvastatin (ZOCOR) 20 MG tablet   3. Traveler's diarrhea     4. Environmental allergies  mometasone (NASONEX) 50 mcg/actuation nasal spray     ---------------------------------------------    1.  Reviewed medication list, refills sent in    2.  See above    3.  Recommended a azithromycin for traveler's diarrhea 1000 mg ×1 given the Cipro resistance and [place]    4.  Nasonex refill by request    Tetanus shot due, but patient left before this was given.    [employer] mobility forms filled out.  Her main indication is being able to walk less than 3 blocks    Return for Next scheduled follow up.      SUBJECTIVE  Elkin Castaneda is a 72-year-old woman who presents for follow-up.  She was last seen by Dr. Barahona November 2 for shortness of breath with activity.  Workup was negative at that time.  She has me about her lab results, they all look normal.  We discussed these.    She would like [employer] mobility forms filled out.  She currently takes the bus.  She can walk less than 3 blocks due to pain from the osteoarthritis in her knees.  She also walks with the assistance of a cane.  These forms were filled out accordingly.  She will pick them up tomorrow morning.    She would like an antibiotic in case she gets diarrhea in [place].  She is going there in 2 days, will remain there for about 3 weeks.    She needs some medications refilled, which were completed today.    She has a runny nose and cough, she would like Tessalon refill.      Medications, allergies, and problem list were reviewed and updated    Patient Active Problem List   Diagnosis     Hyperlipidemia     Osteoarthritis     Osteopenia     Vitamin D  deficiency     No past medical history on file.  Current Outpatient Prescriptions   Medication Sig Dispense Refill     alendronate (FOSAMAX) 35 MG tablet TAKE 1 TABLET BY MOUTH WEEKLY 4 tablet 9     mometasone (NASONEX) 50 mcg/actuation nasal spray INSTILL 2 SPRAYS INTO EACH NOSTIL DAILY AS NEEDED 17 g 2     simvastatin (ZOCOR) 20 MG tablet TAKE 1 TABLET DAILY 90 tablet 3     azithromycin (ZITHROMAX) 500 MG tablet Take 2 tablets (1,000 mg total) by mouth once as needed. 2 tablet 0     benzonatate (TESSALON) 100 MG capsule Take 1 capsule (100 mg total) by mouth 3 (three) times a day as needed for cough. 30 capsule 1     No current facility-administered medications for this visit.      Allergies   Allergen Reactions     No Known Drug Allergies        EXAM  Vitals:    01/09/18 1019   BP: 116/60   Patient Site: Left Arm   Patient Position: Sitting   Cuff Size: Adult Regular   Pulse: (!) 56   Weight: 130 lb 14.4 oz (59.4 kg)     General: Alert, no distress  Psychiatric: Pleasant affect  Neurological no gross abnormalities  Lungs: Nonlabored breathing    Reviewed last clinic note by Dr. Barahona November 2, reviewed lab results with her    Hira Henning DO  Internal Medicine  [facility]

## 2021-06-15 NOTE — PROGRESS NOTES
[facility] Care Coordination Contact    Received email, [facility] staff looking for  for adult day care.    Returned call to adult day care, not available and left voice message with the SA referral number.    Ismael Hanson RN, PHN   [facility]  [phone]

## 2021-06-15 NOTE — PROGRESS NOTES
[facility] Care Coordination    CC was informed we do not submit auth to [employer] for waiver transportation. Just to provide verbal auth to provider.    Called and spoke to Carl ([phone]). Verbally auth 2 roundtrips per week for adult day care transportation effective 9/29/20-8/31/21.    Ismael Hanson RN, PHN   [facility]  [phone]  ?????

## 2021-06-15 NOTE — TELEPHONE ENCOUNTER
Refill Approved    Rx renewed per Medication Renewal Policy. Medication was last renewed on 05/27/2020.  Last office visit was 10/20/2020 with PCP office.    Urvashi Brito, Care Connection Triage/Med Refill 3/7/2021     Requested Prescriptions   Pending Prescriptions Disp Refills     mometasone (NASONEX) 50 mcg/actuation nasal spray [Pharmacy Med Name: MOMETASONE FUROATE 50 MCG SPRY] 17 g 1     Sig: INSTILL 2 SPRAYS INTO EACH NOSTIL DAILY AS NEEDED       Nasal Steroid Refill Protocol Passed - 3/7/2021  9:15 AM        Passed - Patient has had office visit/physical in last 2 years     Last office visit with prescriber/PCP: Visit date not found OR same dept: Visit date not found OR same specialty: 5/29/2019 Ehsan Elizabeth MD Last physical: 10/17/2019 Last MTM visit: Visit date not found    Next appt within 3 mo: Visit date not found  Next physical within 3 mo: Visit date not found  Prescriber OR PCP: Cyril Foss MD  Last diagnosis associated with med order: 1. Environmental allergies  - mometasone (NASONEX) 50 mcg/actuation nasal spray [Pharmacy Med Name: MOMETASONE FUROATE 50 MCG SPRY]; INSTILL 2 SPRAYS INTO EACH NOSTIL DAILY AS NEEDED  Dispense: 17 g; Refill: 1     If protocol passes may refill for 12 months if within 3 months of last provider visit (or a total of 15 months).

## 2021-06-16 PROBLEM — E55.9 VITAMIN D DEFICIENCY: Status: ACTIVE | Noted: 2017-11-02

## 2021-06-16 PROBLEM — F43.20 ADJUSTMENT DISORDER: Status: ACTIVE | Noted: 2020-10-20

## 2021-06-16 PROBLEM — N39.3 SUI (STRESS URINARY INCONTINENCE, FEMALE): Status: ACTIVE | Noted: 2021-01-18

## 2021-06-16 PROBLEM — M81.0 OSTEOPOROSIS: Status: ACTIVE | Noted: 2019-03-12

## 2021-06-16 PROBLEM — H40.039 ANATOMICAL NARROW ANGLE OF EYE: Status: ACTIVE | Noted: 2018-06-24

## 2021-06-16 PROBLEM — Z98.890 H/O LASER IRIDOTOMY: Status: ACTIVE | Noted: 2018-10-16

## 2021-06-16 NOTE — PROGRESS NOTES
[facility] Care Coordination      [facility] Six-Month Telephone Assessment    6 month telephone assessment completed on 3/30/21.    ER visits: No  Hospitalizations: No  TCU stays: No  Significant health status changes: No  Falls/Injuries: No  ADL/IADL changes: No  Changes in services: No    Caregiver Assessment follow up:  n/a    Goals: See POC in chart for goal progress documentation.      Will see member in 6 months for an annual health risk assessment.   Encouraged member to call CC with any questions or concerns in the meantime.     Ismael Hanson RN, PHN   [facility]  [phone]

## 2021-06-17 NOTE — PROGRESS NOTES
Assessment and Plan:   1 annual wellness visit no new major diagnoses; routine blood tests ordered; for mammogram later this fall  2 knee pains will get her into see OrthO for this  3 overall functional status appears to be stable she seems less depressed today than I remember on first meeting her last year; she had an enjoyable trip back home to [place] had some diarrhea but that has all cleared up  4 she had a colonoscopy 2009 should be 73 next year so she can be done with colon cancer screening likewise she no longer needs Pap smears    Problem List Items Addressed This Visit     None      Visit Diagnoses     Health care maintenance    -  Primary    Environmental allergies        Relevant Medications    mometasone (NASONEX) 50 mcg/actuation nasal spray    Hyperlipemia        Relevant Orders    Comprehensive Metabolic Panel    HM2(CBC w/o Differential) (Completed)    Lipid Cascade    Thyroid Stimulating Hormone (TSH)    Knee pain, bilateral        Relevant Orders    Ambulatory referral to Orthopedic Surgery            The patient's current medical problems were reviewed.    I have had an Advance Directives discussion with the patient.  The following health maintenance schedule was reviewed with the patient and provided in printed form in the after visit summary:   Health Maintenance   Topic Date Due     TD 18+ HE  07/12/2016     DXA SCAN  01/12/2018     FALL RISK ASSESSMENT  02/23/2018     COLONOSCOPY  10/13/2019     MAMMOGRAM  11/28/2019     ADVANCE DIRECTIVES DISCUSSED WITH PATIENT  12/23/2019     PNEUMOCOCCAL POLYSACCHARIDE VACCINE AGE 65 AND OVER  Completed     INFLUENZA VACCINE RULE BASED  Completed     PNEUMOCOCCAL CONJUGATE VACCINE FOR ADULTS (PCV13 OR PREVNAR)  Completed     ZOSTER VACCINE  Completed        Subjective:   Chief Complaint: Elkin Castaneda is an 72 y.o. female here for an Annual Wellness visit.   HPI: She comes in today for annual wellness visit.  Here with the  and unlike last  time she seems to be smile and joke not worried she does want to have somebody see her for her chronic knee pain.  Mostly clicking type noises with some pain.  She traveled to [place] no major issues there she did have brief episode of diarrhea that has all cleared up.  Medications reviewed she tolerates them she is due for mammogram later this year    Review of Systems:    Please see above.  The rest of the review of systems are negative for all systems.    Patient Care Team:  Cyril Kelly MD as PCP - General (Internal Medicine)     Patient Active Problem List   Diagnosis     Hyperlipidemia     Osteoarthritis     Osteopenia     Vitamin D deficiency     No past medical history on file.   Past Surgical History:   Procedure Laterality Date     HYSTERECTOMY       [place]  DELIVERY ONLY      Description:  Section;  Recorded: 2008;  Comments: boy     [place] REMOVAL GALLBLADDER      Description: Cholecystectomy;  Recorded: 2008;      Family History   Problem Relation Age of Onset     Breast cancer Neg Hx       Social History     Social History     Marital status: Single     Spouse name: N/A     Number of children: N/A     Years of education: N/A     Occupational History     Not on file.     Social History Main Topics     Smoking status: Never Smoker     Smokeless tobacco: Never Used     Alcohol use Not on file     Drug use: Not on file     Sexual activity: Not on file     Other Topics Concern     Not on file     Social History Narrative      Current Outpatient Prescriptions   Medication Sig Dispense Refill     alendronate (FOSAMAX) 35 MG tablet TAKE 1 TABLET BY MOUTH WEEKLY 4 tablet 9     benzonatate (TESSALON) 100 MG capsule Take 1 capsule (100 mg total) by mouth 3 (three) times a day as needed for cough. 30 capsule 1     mometasone (NASONEX) 50 mcg/actuation nasal spray INSTILL 2 SPRAYS INTO EACH NOSTIL DAILY AS NEEDED 17 g 2     simvastatin (ZOCOR) 20 MG tablet TAKE 1 TABLET DAILY 90 tablet  "3     No current facility-administered medications for this visit.       Objective:   Vital Signs:   Visit Vitals     /74 (Patient Site: Left Arm, Patient Position: Sitting, Cuff Size: Adult Regular)     Pulse 60     Ht 4' 10.5\" (1.486 m)     Wt 130 lb 3.2 oz (59.1 kg)     BMI 26.75 kg/m2        VisionScreening:  No exam data present     PHYSICAL EXAM  Physical Exam:  General-she appears healthier and easier to speak with through the  than last time as I remember; he bounces up out of the chair without any problem but she does complain of some knee pain and wants to see a specialist   VS- see above  HEENT- neg   Neck- no adenopathy/thyromegaly/bruits  Chest- clear   Cor- reg no murmurs/gallops/ectopics  Extremities: no edema, good distal pulses  Neuro- Cr. NN-  intact, alert,   Abdomen- soft non tender, no masses; no organomegaly  Skin- no suspicious lesions for malignancy  Lymph- no pathologic nodes in neck/axilla/groin  Musculoskeletal-no evidence of acute inflammation or synovitis  -      Assessment Results 5/7/2018   Activities of Daily Living No help needed   Instrumental Activities of Daily Living No help needed   Mini Cog Total Score 4   Some recent data might be hidden     A Mini-Cog score of 0-2 suggests the possibility of dementia, score of 3-5 suggests no dementia    Identified Health Risks:     The patient was provided with suggestions to help her develop a healthy lifestyle.   She is at risk for lack of exercise and has been provided with information to increase physical activity for the benefit of her well-being.  Information regarding advance directives (living alcantar), including where she can download the appropriate form, was provided to the patient via the AVS.     Recent Results (from the past 240 hour(s))   HM2(CBC w/o Differential)   Result Value Ref Range    WBC 3.7 (L) 4.0 - 11.0 thou/uL    RBC 4.15 3.80 - 5.40 mill/uL    Hemoglobin 12.5 12.0 - 16.0 g/dL    Hematocrit 37.3 35.0 " - 47.0 %    MCV 90 80 - 100 fL    MCH 30.1 27.0 - 34.0 pg    MCHC 33.4 32.0 - 36.0 g/dL    RDW 12.4 11.0 - 14.5 %    Platelets 173 140 - 440 thou/uL    MPV 8.2 7.0 - 10.0 fL

## 2021-06-18 NOTE — PATIENT INSTRUCTIONS - HE
Patient Instructions by Cara Viera PA-C at 10/20/2020  8:00 AM     Author: Cara Viera PA-C Service: -- Author Type: Physician Assistant    Filed: 10/20/2020  8:12 AM Encounter Date: 10/20/2020 Status: Signed    : Cara Viera PA-C (Physician Assistant)         Patient Education     Exercise for a Healthier Heart  You may wonder how you can improve the health of your heart. If youre thinking about exercise, youre on the right track. You dont need to become an athlete, but you do need a certain amount of brisk exercise to help strengthen your heart. If you have been diagnosed with a heart condition, your doctor may recommend exercise to help stabilize your condition. To help make exercise a habit, choose safe, fun activities.       Be sure to check with your health care provider before starting an exercise program.    Why exercise?  Exercising regularly offers many healthy rewards. It can help you do all of the following:    Improve your blood cholesterol levels to help prevent further heart trouble    Lower your blood pressure to help prevent a stroke or heart attack    Control diabetes, or reduce your risk of getting this disease    Improve your heart and lung function    Reach and maintain a healthy weight    Make your muscles stronger and more limber so you can stay active    Prevent falls and fractures by slowing the loss of bone mass (osteoporosis)    Manage stress better  Exercise tips  Ease into your routine. Set small goals. Then build on them.  Exercise on most days. Aim for a total of 150 or more minutes of moderate to  vigorous intensity activity each week. Consider 40 minutes, 3 to 4 times a week. For best results, activity should last for 40 minutes on average. It is OK to work up to the 40 minute period over time. Examples of moderate-intensity activity is walking one mile in 15 minutes or 30 to 45 minutes of yard work.  Step up your daily activity level. Along  with your exercise program, try being more active throughout the day. Walk instead of drive. Do more household tasks or yard work.  Choose one or more activities you enjoy. Walking is one of the easiest things you can do. You can also try swimming, riding a bike, or taking an exercise class.  Stop exercising and call your doctor if you:    Have chest pain or feel dizzy or lightheaded    Feel burning, tightness, pressure, or heaviness in your chest, neck, shoulders, back, or arms    Have unusual shortness of breath    Have increased joint or muscle pain    Have palpitations or an irregular heartbeat      4260-1732 [employer]. [address] [place] [id number]. All rights reserved. This information is not intended as a substitute for professional medical care. Always follow your healthcare professional's instructions.         Patient Education    Home Fire Safety  Each year, thousands of people, including children, are injured and killed in home fires. Children are often curious about fire, and may not understand the dangers. This makes home fire safety practices especially important. Three important things you can do to keep your home safe from fire are:    Install smoke alarms in your home and make sure they work properly.    Teach children not to play with matches, lighters, and other materials that can be used to start fires. And keep these materials out of childrens reach.    Teach children what to do in case of fire. Create a fire safety action plan and practice it.  Read on for more details about keeping your family and home safe from fire.        Being Prepared for a Fire  A home fire can happen at any time. The following can help you be prepared:    Install smoke alarms on every level of your home, including the basement and outside all sleeping areas. This simple step cuts your familys risk of dying in a fire nearly in half.    Test smoke alarms monthly, and change the batteries once a  year or when the alarm chirps.    Dont disable smoke alarms, even for a short time.    Ask your local fire department for tips on where to place smoke alarms in your home.    Replace all smoke alarms every 10 years.    Consider using voice smoke alarms. These alarms allow you to substitute your own voice for the alarm sound. They are helpful because many children dont wake up to the sound of a regular smoke alarm.    Install carbon monoxide detectors near sleeping areas.    Be aware that carbon monoxide is a byproduct of smoke that can be deadly. Its a gas that you cant see, smell, or taste.    Consider buying a combination smoke alarm / carbon monoxide detector.    Keep fire extinguishers in the home.    Keep them in accessible locations, especially in the kitchen.    Check usage dates to make sure they are not .    Use fire extinguishers only when the fire is in a contained area and is not spreading. (Otherwise, you should focus on getting out of the home.)    Train adults to use fire extinguishers. (Children should focus on getting out of the home during a fire.)    If you live in an apartment, talk to your landlord about where smoke alarms are and how often they are tested. Also ask about fire extinguisher locations and emergency exit routes.  Indoor Fire Safety  Many things in your home are potential fire hazards. Follow these steps to help keep your home safe.    Be careful in the kitchen.    Never leave food thats cooking unattended.    If a fire breaks out in a cooking pan, put a lid on it to smother it. And never throw water on a grease fire. It will make the fire worse.    Conduct a home safety inspection. Look for anything, such as frayed wires and cords, that can cause a fire. Fix or remove any fire safety hazards you find.    Keep all matches and lighters in a secured drawer or cabinet out of the reach of children. Use childproof lighters.    Check to make sure all appliances, including the  stove, are turned off before leaving the home.    Know where the gas main shut-off is located.    Make sure space heaters are stable and have protective covers. Keep them at least 3 feet from anything that can burn, such as curtains. Dont use space heaters in areas where young kids spend time alone.    Keep flammable liquids such as kerosene and gasoline locked up and safely stored away from kids and heat.    Keep all smoking materials out of reach of children. And never smoke in bed. If possible, smoke outdoors only.  Outdoor Fire Safety  Fire can be a hazard outdoors as well as indoors. When outdoors, be sure to do the following:    Always supervise kids near a barbecue grill, campfire, or portable stove.    Dont use fire pits around children. Kids can fall into them, and pits can be hot even after the fire goes out.    Keep a garden hose or fire extinguisher handy when cooking outdoors in case of fire.  Teaching Your Child About Fire Safety  One of the best ways to keep your home safe from fire is education. Make sure everyone in your family knows fire safety rules, including children.    Teach your children the dangers of matches, lighters, and other dangerous items.    Teach them to never touch these and other objects that are hot, such as candles.    Have them tell you right away whenever they find matches or lighters. Explain that these items are tools for grown-ups, not toys. And never amuse children with matches or lighters.    Round up all matches and lighters and store them safely. In case you missed some, ask your children to tell you where any are located throughout your home.    Never leave a child alone in a room with a lit candle. Dont allow teens to have candles in their rooms.    Show children what to do in case of fire.    Be sure your kids know what the fire alarm sounds like and what to do if it goes off.    Teach kids what to do if their clothes catch fire: Stop, Drop to the ground, and Roll  until the fire is put out. They should also cover their face with their hands. Practice these steps with your children. Make sure they understand that running will make the fire burn faster.    Show children how to crawl below smoke during a fire.    Make sure kids know at least two escape routes from each room in the home. These escape routes can be windows.    Teach kids to test doors for nearby fire by feeling for heat with the back of their hand. If the door is warm or hot, they should try their second exit.    Explain to children that they cant hide from a fire. Hiding in a closet or under a bed wont make them safe. Instead, they should try to escape the home. And if they cant escape, they should let others know they are trapped. They can do this by shutting the door to the room, opening a window, and turning on the lights.    Talk to your local fire department.    Introduce your children to a . Let them know that firefighters will look different when in full protective gear. Tell them to never hide from firefighters, and to follow all directions from firefighters during a fire.    Find out if the fire department has a fire safety program for kids.      Create a Fire Safety Plan  Create a plan for your family to follow in case of a fire. Try making it a family project. Important steps for the plan include leaving the home right away and having a designated meeting place.    Make sure your child understands to get out and stay out. He or she should get out of the home immediately and not go back in, even if family members or pets are still inside.    Decide on a safe meeting place away from the home for everyone to gather.    Teach children to call 911 or emergency services from a cell phone or neighbors phone. Make sure they know to do this only after they are safely out of the home.    Teach your children the fire safety plan. Practice it and make sure they understand it.    Have fire drills twice a  year to keep your children prepared in case of fire.    Visit the National Fire Protection Association web site at www.nfpa.org for more information.      8394-0446 [employer]. [address] [place] [address], [place] [address]. All rights reserved. This information is not intended as a substitute for professional medical care. Always follow your healthcare professional's instructions.         Patient Education   Instrumental Activities of Daily Living  Your Health Risk Assessment indicates you have difficulties with instrumental activities of daily living which include laundry, housekeeping, banking, shopping, using the telephone, food preparation, transportation, or taking your own medications. Please make a follow up appointment for us to address this issue in more detail.    [employer] has resources available on the following website: [email or web address]     Also, here is a local agency that provides help with meals and other assistance:   [facility] Line: [phone]     Patient Education   Depression and Suicide in Older Adults  Nearly 2 million older Americans have some type of depression. Sadly, some of them even take their own lives. Yet depression among older adults is often ignored. Learn the warning signs. You may help spare a loved one needless pain. You may also save a life.       What Is Depression?  Depression is a mood disorder that affects the way you think and feel. The most common symptom is a feeling of deep sadness. People who are depressed also may seem tired and listless. And nothing seems to give them pleasure. Its normal to grieve or be sad sometimes. But sadness lessens or passes with time. Depression rarely goes away or improves on its own. Other symptoms of depression are:    Sleeping more or less than normal    Eating more or less than normal    Having headaches, stomachaches, or other pains that dont go away    Feeling nervous, empty, or  worthless    Crying a great deal    Thinking or talking about suicide or death    Feeling confused or forgetful  What Causes It?  The causes of depression arent fully known. Certain chemicals in the brain play a role. Depression does run in families. And life stresses can also trigger depression in some people. That may be the case with older adults. They often face great burdens, such as the death of friends or a spouse. They may have failing health. And they are more likely to be alone, lonely, or poor.  How You Can Help  Often, depressed people may not want to ask for help. When they do, they may be ignored. Or, they may receive the wrong treatment. You can help by showing parents and older friends love and support. If they seem depressed, help them find the right treatment. Talk to your doctor. Or contact a local mental health center, social service agency, or hospital. With modern treatment, no one has to suffer from depression.  Resources:    National [place] of Mental Health  695.883.7820  www.nimh.nih.gov    National [place] on Mental Illness  482.738.6784  www.martha.org    [facility]  813.901.2376  www.[facility].org    National Suicide Hotline  662.842.3448 (800-SUICIDE)      9440-3207 [employer]. [address] [place] [address], [place] [address]. All rights reserved. This information is not intended as a substitute for professional medical care. Always follow your healthcare professional's instructions.         Patient Education   Preventing Falls in the Home  As you get older, falls are more likely. Thats because your reaction time slows. Your muscles and joints may also get stiffer, making them less flexible. Illness, medications, and vision changes can also affect your balance. A fall could leave you unable to live on your own. To make your home safer, follow these tips:    Floors    Put nonskid pads under area rugs.    Remove throw rugs.    Replace worn floor coverings.    Tack carpets  firmly to each step on carpeted stairs. Put nonskid strips on the edges of uncarpeted stairs.    Keep floors and stairs free of clutter and cords.    Arrange furniture so there are clear pathways.    Clean up any spills right away.    Bathrooms    Install grab bars in the tub or shower.    Apply nonskid strips or put a nonskid rubber mat in the tub or shower.    Sit on a bath chair to bathe.    Use bathmats with nonskid backing.    Lighting    Keep a flashlight in each room.    Put a nightlight along the pathway between the bedroom and the bathroom.    4600-0704 [employer]. [address] [place] [address], [place] [id number]. All rights reserved. This information is not intended as a substitute for professional medical care. Always follow your healthcare professional's instructions.         Patient Education   Understanding Advance Care Planning  Advance care planning is the process of deciding ones own future medical care. It helps ensure that if you cant speak for yourself, your wishes can still be carried out. The plan is a series of legal documents that note a persons wishes. The documents vary by state. Advance care planning may be done when a person has a serious illness that is expected to get worse. It may be done before major surgery. And it can help you and your family be prepared in case of a major illness or injury. Advance care planning helps with making decisions at these times.       A health care proxy is a person who acts as the voice of a patient when the patient cant speak for himself or herself. The name of this role varies by state. It may be called a Durable Medical Power of  or Durable Power of  for Healthcare. It may be called an agent, surrogate, or advocate. Or it may be called a representative or decision maker. It is an official duty that is identified by a legal document. The document also varies by state.    Why Is Advance Care Planning Important?  If a person  communicates their healthcare wishes:    They will be given medical care that matches their values and goals.    Their family members will not be forced to make decisions in a crisis with no guidance.  Creating a Plan  Making an advance care plan is often done in 3 steps:    Thinking about ones wishes. To create an advance care plan, you should think about what kind of medical treatment you would want if you lose the ability to communicate. Are there any situations in which you would refuse or stop treatment? Are there therapies you would want or not want? And whom do you want to make decisions for you? There are many places to learn more about how to plan for your care. Ask your doctor or  for resources.    Picking a health care proxy. This means choosing a trusted person to speak for you only when you cant speak for yourself. When you cannot make medical decisions, your proxy makes sure the instructions in your advance care plan are followed. A proxy does not make decisions based on his or her own opinions. They must put aside those opinions and values if needed, and carry out your wishes.    Filling out the legal documents. There are several kinds of legal documents for advance care planning. Each one tells health care providers your wishes. The documents may vary by state. They must be signed and may need to be witnessed or notarized. You can cancel or change them whenever you wish. Depending on your state, the documents may include a Healthcare Proxy form, Living Will, Durable Medical Power of , Advance Directive, or others.  The Familys Role  The best help a family can give is to support their loved ones wishes. Open and honest communication is vital. Family should express any concerns they have about the patients choices while the patient can still make decisions.    3201-0788 [employer]. [address] [place] [address], [place] [address]. All rights reserved. This information is  not intended as a substitute for professional medical care. Always follow your healthcare professional's instructions.         Also, [facility] offers a free, downloadable health care directive that allows you to share your treatment choices and personal preferences if you cannot communicate your wishes. It also allows you to appoint another person (called a health care agent) to make health care decisions if you are unable to do so. You can download an advance directive by going here: [email or web address]     Patient Education   Personalized Prevention Plan  You are due for the preventive services outlined below.  Your care team is available to assist you in scheduling these services.  If you have already completed any of these items, please share that information with your care team to update in your medical record.  Health Maintenance   Topic Date Due   ? DEPRESSION ACTION PLAN  1945   ? MEDICARE ANNUAL WELLNESS VISIT  10/20/2021   ? FALL RISK ASSESSMENT  10/20/2021   ? DXA SCAN  10/29/2021   ? LIPID  05/07/2023   ? ADVANCE CARE PLANNING  10/17/2024   ? TD 18+ HE  03/12/2029   ? Pneumococcal Vaccine: 65+ Years  Completed   ? INFLUENZA VACCINE RULE BASED  Completed   ? ZOSTER VACCINES  Completed   ? Pneumococcal Vaccine: Pediatrics (0 to 5 Years) and At-Risk Patients (6 to 64 Years)  Aged Out   ? HEPATITIS B VACCINES  Aged Out

## 2021-06-18 NOTE — PROGRESS NOTES
[facility] Certification Request    June 28, 2018      Patient: Elkin Castaneda  MR Number: [id number]  YOB: 1945  Date of Visit: 6/28/2018      Dear Dr. Kelly,     Thank you for this referral.   We are seeing Elkin Castaneda for Physical Therapy for bilateral knee pain.    Medicare and/or Medicaid requires physician review and approval of the treatment plan. Please review the plan of care and verify that you agree with the therapy plan of care by co-signing this note.      Plan of Care  Authorization / Certification Start Date: 06/28/18  Authorization / Certification End Date: 08/02/18  Authorization / Certification Number of Visits: 10  Communication with: Referral Source  Patient Related Instruction: Nature of Condition;Treatment plan and rationale;Body mechanics;Next steps  Times per Week: 2  Number of Weeks: 5  Number of Visits: 10  Discharge Planning: HEP  Therapeutic Exercise: ROM;Stretching;Strengthening  Neuromuscular Reeducation: kinesio tape;posture;balance/proprioception  Manual Therapy: soft tissue mobilization;myofascial release;joint mobilization  Modalities: ultrasound  Equipment: Everypostaband    Goals:  Pt. will be independent with home exercise program in : 2 weeks  Pt. will report decreased intensity, frequency of : in 4 weeks  Pt. will be able to walk : 2 blocks;with less pain;for community mobility;in 4 weeks  Patient will stand : 10 minutes;with less pain  Pt will: ascend and descend 7-8 stairs with less pain in 5 weeks.       If you have any questions or concerns, please don't hesitate to call.    Sincerely,      Allie Vila, PT        Physician recommendation:     ___ Follow therapist's recommendation        ___ Modify therapy      *Physician co-signature indicates they certify the need for these services furnished within this plan and while under their care.    [facility]   Knee Initial Evaluation    Patient Name: Elkin Castaneda  Date of  evaluation: 6/28/2018  Referral Diagnosis: Knee pain, bilateral [M25.561, M25.562]  - Primary   Referring provider: Cyril Kelly, *  Visit Diagnosis:     ICD-10-CM    1. Chronic pain of both knees M25.561     M25.562     G89.29      Precautions: Osteoarthritis, Osteopenia    present   Assessment:    Patient is a 72 year old female presenting with chronic bilateral knee pain that started gradually about 6 months ago.  Pt presents with generalized LE weakness and decreased knee ROM.  Pt has difficulty with sitting, standing and walking secondary to pain.  Pt will benefit from LE strengthening program.    Pt. is appropriate for skilled PT intervention as outlined in the Plan of Care (POC).  Pt. is a good candidate for skilled PT services to improve pain levels and function.      Goals:  Pt. will be independent with home exercise program in : 2 weeks  Pt. will report decreased intensity, frequency of : in 4 weeks  Pt. will be able to walk : 2 blocks;with less pain;for community mobility;in 4 weeks  Patient will stand : 10 minutes;with less pain  Pt will: ascend and descend 7-8 stairs with less pain in 5 weeks.     Goals and plan of care were set in collaboration with the patient.    Patient's expectations/goals are realistic.    Barriers to Learning or Achieving Goals:  Chronicity of the problem.       Plan / Patient Instructions:      Plan of Care:   Authorization / Certification Start Date: 06/28/18  Authorization / Certification End Date: 08/02/18  Authorization / Certification Number of Visits: 10  Communication with: Referral Source  Patient Related Instruction: Nature of Condition;Treatment plan and rationale;Body mechanics;Next steps  Times per Week: 2  Number of Weeks: 5  Number of Visits: 10  Discharge Planning: HEP  Therapeutic Exercise: ROM;Stretching;Strengthening  Neuromuscular Reeducation: kinesio tape;posture;balance/proprioception  Manual Therapy: soft tissue mobilization;myofascial  release;joint mobilization  Modalities: ultrasound  Equipment: theraband    Treatment techniques, plan of care, and goals were discussed with the patient. The patient agrees to the plan as outlined. The plan of care is dynamic and will be modified on an ongoing basis.  Plan for next visit: progress HEP, LE strengthening, assess step-down, step-up ex, bridge, marching, flexion mobs  ASK about pain in R foot      Subjective:        Social information:   Living Situation:apartment - with elevator would like to be able to perform 7-8 stairs.    Equipment Available: SE espinosa uses it in winter with the ice.     History of Present Illness:    Elkin is a 72 y.o. female who presents to therapy today with complaints of bilateral knee pain. Date of onset/duration of symptoms was 6 months ago. Onset was gradual. Symptoms are getting worse. She denies history of similar symptoms. She describes their previous level of function as not limited    Pain Ratin  Pain rating at best: 0  Pain rating at worst: 8  Pain description:dull pain with sitting, sharp pain with walking    Functional limitations are described as occurring with:   sitting >15 min  standing >10 min  walking >10 min    Patient reports benefit from:  heat and rest  Worse with walking, and stairs     Objective:      Note: Items left blank indicates the item was not performed or not indicated at the time of the evaluation.    Patient Outcome Measures :    Lower Extremity Functional Scale (_/80): 28   Scores range from 0-80, where a score of 80 represents maximum function. The minimal clinically important difference is a positive change of 9 points.    Low Back: flexion and ext WFL - pain in knees with lumber flexion    Knee Examination  1. Chronic pain of both knees       Precautions/Restrictions:  None  Involved Side: Bilateral - pain in R hip and ball of foot   Posture Observation:      General sitting posture is  normal.  Assistive Device: None - but uses SEC in  winter   Gait Observation: will assess next session     Knee ROM     Date:      AROM in degrees  Right   Left  Right   Left  Right   Left       Knee Flexion  (130 )   125 with knee pain   130 with knee pain                    Knee Extension  (0 )  Lacking 5 degrees pain with overpressure   Lacking 5 degrees pain with overpressure                 PROM in degrees  Right   Left  Right   Left  Right   Left       Knee Flexion  (130 )                         Knee Extension  (0 )                       HIP PROM:   R IR: WFL no pain  R ER limited 50% pain in lateral knee  L IR: WFL no pain   L ER: limited 50% pain in lateral knee  R hip flexion: limited 50% pain in lateral hip  L hip flexion: limited 50% no pain      LE Strength                  Date:    Strength (MMT/5)  Right   Left  Right   Left  Right   Left       Hip Flexion   3/5 with quad/knee pain   3/5 with quad/knee pain                   Hip Abduction   3/5   3/5                   Hip Adduction                         Hip Extension                        Hip Internal Rotation   3+/5   3+/5                   Hip External Rotation   3+/5   3+/5                   Knee Extension   5/5   5/5                   Knee Flexion   4/5   4/5                   Ankle Dorsiflexion                         Ankle Plantarflexion                         Palpation:  Pain in R medial joint line, L lateral joint line and with patellar movement   Pt reports R hip pain with movement.     Knee Special Tests (+/-):      Knee OA Cluster   Right   Left   Ligament Tests   Right   Left    1. > 51 y/o           Lachman          2. Stiffness > 30 min.           Anterior Drawer          3. Crepitus           Posterior Drawer          4. Bony tenderness           Posterior Sag          5. Bone enlargement           Valgus Stress   -   -    6. No warmth to the touch           Varus Stress   -   -     Meniscal Tests   Right   Left    Other   Right    Left       Alie's   -   -     Ely's              Joint line tenderness   Medial    Lateral      Bonny             Fiona   -   -     Thomas Apley's                          Treatment Today:   Exercises:  Exercise #1: SLR 2x10 1x a day   Exercise #2: Clams 2x10 1x a day      TREATMENT MINUTES COMMENTS   Evaluation 25 -knee pain   Self-care/ Home management     Manual therapy     Neuromuscular Re-education     Therapeutic Activity     Therapeutic Exercises 20 -see exercise flow sheet  -educated on POC, diagnosis and HEP   Gait training     Modality__________________                Total 45    Blank areas are intentional and mean the treatment did not include these items.     PT Evaluation Code: (Please list factors)  Patient History/Comorbidities: Osteoporosis, Osteopenia  Examination: LE, low back   Clinical Presentation: stable   Clinical Decision Making: low    Patient History/  Comorbidities Examination  (body structures and functions, activity limitations, and/or participation restrictions) Clinical Presentation Clinical Decision Making (Complexity)   No documented Comorbidities or personal factors 1-2 Elements Stable and/or uncomplicated Low   1-2 documented comorbidities or personal factor 3 Elements Evolving clinical presentation with changing characteristics Moderate   3-4 documented comorbidities or personal factors 4 or more Unstable and unpredictable High                Allie Vila, PT, DPT  6/28/2018  8:02 AM

## 2021-06-19 NOTE — PROGRESS NOTES
"[facility] Daily Progress     Patient Name: Elkin Castaneda  Date: 7/16/2018  Visit #: 4/10  PTA visit #:  0  Referral Diagnosis: Knee pain, bilateral [M25.561, M25.562]  - Primary   Referring provider: Cyril Kelly, *  Visit Diagnosis:     ICD-10-CM    1. Chronic pain of both knees M25.561     M25.562     G89.29          Assessment:   Pt responded well to advancement of HEP.  Reports throughout session that her legs are tired, but no pain reports, except when performing ex's on SL. Advanced HEP and asked pt to walk up and down the stairs when she goes on her daily walk (2 blocks).   Patient demonstrates understanding/independence with home program.  Patient is benefitting from skilled physical therapy and is making steady progress toward functional goals.  Patient is appropriate to continue with skilled physical therapy intervention, as indicated by initial plan of care.    Goal Status:  Pt. will be independent with home exercise program in : 2 weeks  Pt. will report decreased intensity, frequency of : in 4 weeks  Pt. will be able to walk : 2 blocks;with less pain;for community mobility;in 4 weeks  Patient will stand : 10 minutes;with less pain  Pt will: ascend and descend 7-8 stairs with less pain in 5 weeks.     Plan / Patient Education:   Next Session: continue to progress HEP, standing marching, review HEP   Continue with initial plan of care.  Progress with home program as tolerated.    Subjective:   Patient reports she is 80% better, no pain just tiredness of muscle.        Objective:   Exercises:  Exercise #1: SLR x10   Exercise #2: Clams x12 with yellow band   Exercise #3: Bridge with glut squeeze - 2 sec hold x10   Exercise #4: hip abduction side lying x10   Exercise #5: sit<>stand 2x10   Exercise #6: step-ups 4\" stair x15, x10   Exercise #7: Standing hip flexion/abduction/extension  2x10   Exercise #8: Monster walks with yellow band 2x10'     Treatment Today     TREATMENT MINUTES " COMMENTS   Evaluation     Self-care/ Home management     Manual therapy     Neuromuscular Re-education     Therapeutic Activity     Therapeutic Exercises 23 Added to HEP see flow sheet      Gait training     Modality__________________                Total 23    Blank areas are intentional and mean the treatment did not include these items.       Allie Vila, PT, DPT   7/16/2018

## 2021-06-19 NOTE — PROGRESS NOTES
"[facility] Daily Progress     Patient Name: Elkin Castaneda  Date: 7/20/2018  Visit #: 5/10  PTA visit #:  0  Referral Diagnosis: Knee pain, bilateral [M25.561, M25.562]  - Primary   Referring provider: Cyril Kelly, *  Visit Diagnosis:     ICD-10-CM    1. Chronic pain of both knees M25.561     M25.562     G89.29          Assessment:   Pt responded well to advancement of HEP, reviewed HEP and gave pt cues for proper alignment.  Reports throughout session that her legs are tired, but no pain reportsPatient demonstrates understanding/independence with home program.  Patient is benefitting from skilled physical therapy and is making steady progress toward functional goals.  Patient is appropriate to continue with skilled physical therapy intervention, as indicated by initial plan of care.    Goal Status:  Pt. will be independent with home exercise program in : 2 weeks  Pt. will report decreased intensity, frequency of : in 4 weeks  Pt. will be able to walk : 2 blocks;with less pain;for community mobility;in 4 weeks  Patient will stand : 10 minutes;with less pain  Pt will: ascend and descend 7-8 stairs with less pain in 5 weeks.     Plan / Patient Education:   Next Session: continue to progress HEP, standing marching, review HEP   Continue with initial plan of care.  Progress with home program as tolerated.    Subjective:   Continues to have fatigue with activity.  Pt reports sleeping well.        Objective:   Exercises:  Exercise #1: SLR x10   Exercise #2: Clams x12 with yellow band   Exercise #3: Bridge with glut squeeze - 5 sec hold x10   Exercise #4: hip abduction side lying x10   Exercise #5: sit<>stand 2x10 - cues to avoid favoring R LE   Exercise #6: step-ups 4\" stair x15, x10   Exercise #7: Standing hip flexion/abduction/extension  2x10   Exercise #8: Monster walks with yellow band 2x10'   Exercise #9: DKTC/SKTC 30 sec each     Treatment Today     TREATMENT MINUTES COMMENTS   Evaluation   "   Self-care/ Home management     Manual therapy     Neuromuscular Re-education     Therapeutic Activity     Therapeutic Exercises 25 Added to HEP see flow sheet      Gait training     Modality__________________                Total 25    Blank areas are intentional and mean the treatment did not include these items.       Allie Vila, PT, DPT   7/20/2018

## 2021-06-19 NOTE — PROGRESS NOTES
[facility] Daily Progress     Patient Name: Elkin Castaneda  Date: 7/5/2018  Visit #: 2/10  PTA visit #:  0  Referral Diagnosis: Knee pain, bilateral [M25.561, M25.562]  - Primary   Referring provider: Cyril Kelly, *  Visit Diagnosis:     ICD-10-CM    1. Chronic pain of both knees M25.561     M25.562     G89.29          Assessment:   Pt responded well to manual therapy and advancement of HEP.    Patient demonstrates understanding/independence with home program.  Patient is benefitting from skilled physical therapy and is making steady progress toward functional goals.  Patient is appropriate to continue with skilled physical therapy intervention, as indicated by initial plan of care.    Goal Status:  Pt. will be independent with home exercise program in : 2 weeks  Pt. will report decreased intensity, frequency of : in 4 weeks  Pt. will be able to walk : 2 blocks;with less pain;for community mobility;in 4 weeks  Patient will stand : 10 minutes;with less pain  Pt will: ascend and descend 7-8 stairs with less pain in 5 weeks.     Plan / Patient Education:   Next Session: assess step downs, step-ups, continue to progress HEP, side stepping, standing marching   Continue with initial plan of care.  Progress with home program as tolerated.    Subjective:   Reports pain is slightly better in knees.          Objective:   R knee flexion 132   Post- physiologic flexion mob 138     L knee flexion 134  Post-physiologic flexion mob 138     Exercises:  Exercise #1: SLR x10   Exercise #2: Clams x10   Exercise #3: Bridge with glut squeeze - 2 sec hold x10   Exercise #4: hip abduction side lying x10       Treatment Today     TREATMENT MINUTES COMMENTS   Evaluation     Self-care/ Home management     Manual therapy 2 physiologic flexion    Neuromuscular Re-education     Therapeutic Activity     Therapeutic Exercises 23 Added to HEP see flow sheet    Gait training     Modality__________________                Total  25    Blank areas are intentional and mean the treatment did not include these items.       Allie Vila, PT, DPT   7/5/2018

## 2021-06-19 NOTE — PROGRESS NOTES
[facility] Daily Progress     Patient Name: Elkin Castaneda  Date: 7/24/2018  Visit #: 6/10  PTA visit #:  0  Referral Diagnosis: Knee pain, bilateral [M25.561, M25.562]  - Primary   Referring provider: Cyril Kelly, *  Visit Diagnosis:     ICD-10-CM    1. Chronic pain of both knees M25.561     M25.562     G89.29          Assessment:   Patient demonstrates understanding/independence with home program.  Patient is benefitting from skilled physical therapy and is making steady progress toward functional goals.  Patient is appropriate to continue with skilled physical therapy intervention, as indicated by initial plan of care.     Pt continues to report her LE feels tired but no complaints of pain.  Discharged today.  Will leave chart open for 30 days so pt can return to therapy if needed.     Goal Status:  Pt. will be independent with home exercise program in : 2 weeks  Pt. will report decreased intensity, frequency of : in 4 weeks  Pt. will be able to walk : 2 blocks;with less pain;for community mobility;in 4 weeks  Patient will stand : 10 minutes;with less pain  Pt will: ascend and descend 7-8 stairs with less pain in 5 weeks.     Plan / Patient Education:   D/c today.     Subjective:   Continues to have fatigue with activity.  Reports she is walking 2 blocks daily, performing stairs and 2 ex's a day - 2 sets.       Objective:     Knee ROM at evaluation                                     Date:  7/25/18    AROM in degrees  Right   Left  Right   Left  Right   Left       Knee Flexion  (130 )   125 with knee pain   130 with knee pain    135   135             Knee Extension  (0 )  Lacking 5 degrees pain with overpressure   Lacking 5 degrees pain with overpressure   0   0           PROM in degrees  Right   Left  Right   Left  Right   Left       Knee Flexion  (130 )                         Knee Extension  (0 )                        HIP PROM:   R IR: WFL no pain  R ER limited 25% pain hip  L IR: WFL no  "pain   L ER: limited 25% pain in lateral knee     LE Strength                  Date:    Strength (MMT/5)  Right   Left  Right   Left  Right   Left       Hip Flexion   3/5 with quad/knee pain   3/5 with quad/knee pain   3+   3+             Hip Abduction   3/5   3/5   3   3+             Hip Adduction                         Hip Extension                         Hip Internal Rotation   3+/5   3+/5   4   4             Hip External Rotation   3+/5   3+/5   4   4             Knee Extension   5/5   5/5                   Knee Flexion   4/5   4/5   4   4             Ankle Dorsiflexion                         Ankle Plantarflexion                              Exercises:  Exercise #1: SLR x10   Exercise #2: Clams x12 with yellow band   Exercise #3: Bridge with glut squeeze - 5 sec hold x10   Exercise #4: hip abduction side lying x10   Exercise #5: sit<>stand 2x10 - cues to avoid favoring R LE   Exercise #6: step-ups 4\" stair x15, x10   Exercise #7: Standing hip flexion/abduction/extension  2x10   Exercise #8: Monster walks with yellow band 2x10'   Exercise #9: DKTC/SKTC 30 sec each     Treatment Today:    TREATMENT MINUTES COMMENTS   Evaluation     Self-care/ Home management     Manual therapy     Neuromuscular Re-education     Therapeutic Activity     Therapeutic Exercises 23 Reviewed HEP     Gait training     Modality__________________                Total 23    Blank areas are intentional and mean the treatment did not include these items.       Allie Vila, PT, DPT   7/24/2018     [facility] Discharge Summary      Goals:  Pt. will be independent with home exercise program in : 2 weeks MET  Pt. will report decreased intensity, frequency of : in 4 weeks MET  Pt. will be able to walk : 2 blocks;with less pain;for community mobility;in 4 weeks MET  Patient will stand : 10 minutes;with less pain MET  Pt will: ascend and descend 7-8 stairs with less pain in 5 weeks.  MET    Outcome: LEFS 53/80    Patient was " seen for 6 visits from 6/28/18 to 7/24/18 with 0 missed appointments.  The patient met goals and has demonstrated understanding of and independence in the home program for self-care, and progression to next steps.  She will initiate contact if questions or concerns arise.    Therapy will be discontinued at this time.  The patient will need a new referral to resume.    Thank you for your referral.  Allie Vila  7/24/2018  7:47 AM

## 2021-06-19 NOTE — LETTER
Letter by Ehsan Elizabeth MD at      Author: Ehsan Elizabeth MD Service: -- Author Type: --    Filed:  Encounter Date: 5/29/2019 Status: (Other)         Elkin Castaneda  [address]  [place] [address]             May 29, 2019         Dear Ms. Castaneda,    Below are the results from your recent visit:    Resulted Orders   XR Knee Left 1 or 2 VWS    Narrative    EXAM: XR KNEE LEFT 1 OR 2 VWS  LOCATION: [facility]  DATE/TIME: 5/29/2019 8:32 AM    INDICATION: Pain in left lower leg  COMPARISON: 03/12/2019.    FINDINGS: Negative knee. No fracture or dislocation. No joint effusion.       Xray ok.    Please call with questions or contact us using [email or web address].    Sincerely,        Electronically signed by Ehsan Elizabeth MD

## 2021-06-19 NOTE — LETTER
Letter by Ehsan Elizabeth MD at      Author: Ehsan Elizabeth MD Service: -- Author Type: --    Filed:  Encounter Date: 5/30/2019 Status: (Other)         Elkin Castaneda  [address]  [place] [address]             May 30, 2019         Dear Ms. Castaneda,    Below are the results from your recent visit:    Resulted Orders   US Venous Leg Left    Narrative    EXAM DATE:         05/29/2019    EXAM: US LOWER EXTREMITY VEINS LTD LEFT  LOCATION: [facility] [place]  DATE/TIME: 5/29/2019 6:30 PM    INDICATION: pain posterior ? baker`s vs. venous  COMPARISON: None.  TECHNIQUE: Routine exam without and with compression, augmentation, and duplex  utilizing 2D gray-scale imaging, Doppler interrogation with color-flow and  spectral waveform analysis.    FINDINGS: The common femoral, femoral, popliteal, and segmentally visualized  calf veins were evaluated. The opposite CFV was also included in the evaluation.    Left leg veins are negative for deep venous thrombosis. No popliteal cysts.    CONCLUSION:  1.  Left leg veins are negative for DVT.    2.  No popliteal cyst.               No blood clot or baker's cyst.    Please call with questions or contact us using [email or web address].    Sincerely,        Electronically signed by Ehsan Elizabeth MD

## 2021-06-19 NOTE — LETTER
Letter by Cyril Foss MD at      Author: Cyril Foss MD Service: -- Author Type: --    Filed:  Encounter Date: 11/1/2019 Status: Signed         [address]  [address]  [place] [address]     November 1, 2019     Dear Ms. Castaneda,    Below are the results from your recent visit:    Resulted Orders   DXA Bone Density Scan    Narrative    10/29/2019      RE: Elkin Castaneda  YOB: 1945        Dear Cyril Foss,    Patient Profile:  74 y.o. female, postmenopausal, is here for the follow up bone density   test.   History of fractures - None. Family history of osteoporosis - None.    Family history of hip fracture: None. Smoking history - No. Osteoporosis   treatment past -  No. Osteoporosis treatment current - No.  Chronic   medical problems - Height loss, Hysterectomy, Oophorectomy and Premature   menopause. High risk medications -  None.    Assessment:    1. The spine bone density reveals low bone mass at L1-L4 with T-score of   -1.8.  2. Femoral bone densities show left femoral neck T- score of -2.3, and   right femoral neck T-score of -2.2..  3.  Since the scan in 2016, bone density has increased a significant 3% in   the left total hip and 2.7% in the right total hip.  In the AP spine, bone   density has not significantly changed.  4.  The trabecular bone score could not be assessed    74 y.o. female with LOW BONE DENSITY (OSTEOPENIA) and MODERATE predicted   hip fracture risk with a 10-year major osteoporotic fracture risk of 9.2 %   and hip fracture risk of 2.7 %.  This prediction tool forecast a   substantially lower fracture risk for an  female compared to    at the same age and with the same measured bone density.    Recommendations:  Ensure adequate calcium, vitamin D intake, and regular weightbearing   exercise with a recheck in 2 years..      Bone densitometry was performed on your patient using our [employer]   densitometer. The results are  summarized and a copy of the actual scans   are included for your review. In conformity with the International Society   of Clinical Densitometry's most recent position statement for DXA   interpretation (2015), the diagnosis will be made on the lowest measured   T-score of the lumbar spine, femoral neck, total proximal femur or 33%   radius. Note the change in terminology for diagnostic classification from   OSTEOPENIA to LOW BONE MASS. All trending for sequential exams will be   done using multiple vertebrae or the total proximal femur. Fracture risk   is based on the WHO Fracture Risk Assessment Tool (FRAX). If additional   information is needed or if you would like to discuss the results, please   do not hesitate to call me.       Thank you for referring this patient to [facility].   We are happy to be of service in support of you and your practice. If you   have any questions or suggestions to improve our service, please call me   at [phone].     Sincerely,     Elias Allen M.D. C.C.D.  Osteoporosis Services, [facility]         Your bone strength has improved.  Continue taking the vitamin D and calcium.      Please call with questions or contact us using [employer].    Sincerely,        Electronically signed by Cyril Foss MD

## 2021-06-19 NOTE — PROGRESS NOTES
"[facility] Daily Progress     Patient Name: Elkin Castaneda  Date: 7/9/2018  Visit #: 3/10  PTA visit #:  0  Referral Diagnosis: Knee pain, bilateral [M25.561, M25.562]  - Primary   Referring provider: Cyril Kelly, *  Visit Diagnosis:     ICD-10-CM    1. Chronic pain of both knees M25.561     M25.562     G89.29          Assessment:   Pt responded well to manual therapy and advancement of HEP.  Reports throughout session that her legs are tired, but minimal pain (L knee with step-ups).  Advanced HEP and asked pt to increase ambulation from 15 min to 20 min in her building.   Patient demonstrates understanding/independence with home program.  Patient is benefitting from skilled physical therapy and is making steady progress toward functional goals.  Patient is appropriate to continue with skilled physical therapy intervention, as indicated by initial plan of care.    Goal Status:  Pt. will be independent with home exercise program in : 2 weeks  Pt. will report decreased intensity, frequency of : in 4 weeks  Pt. will be able to walk : 2 blocks;with less pain;for community mobility;in 4 weeks  Patient will stand : 10 minutes;with less pain  Pt will: ascend and descend 7-8 stairs with less pain in 5 weeks.     Plan / Patient Education:   Next Session: lateral stepping, perform stairs, continue to progress HEP (add band to clams), standing marching   Continue with initial plan of care.  Progress with home program as tolerated.    Subjective:   Patient reports she is 50% better, no pain just tiredness of muscle.  After standing or walking for 15 minutes knees feel \"tired\".  Has been doing ex's every other day.         Objective:   R/L knee flexion 135  Patient reports minimal L knee pain with step-ups     Exercises:  Exercise #1: SLR x10   Exercise #2: Clams x10   Exercise #3: Bridge with glut squeeze - 2 sec hold x10   Exercise #4: hip abduction side lying x10   Exercise #5: sit<>stand 2x10   Exercise " "#6: step-ups 4\" stair x15, x10     Treatment Today     TREATMENT MINUTES COMMENTS   Evaluation     Self-care/ Home management     Manual therapy     Neuromuscular Re-education     Therapeutic Activity     Therapeutic Exercises 23 Added to HEP see flow sheet   During session performed monster walks with orange band 5x10'   Gait training     Modality__________________                Total 23    Blank areas are intentional and mean the treatment did not include these items.       Allie Vila, PT, DPT   7/9/2018    "

## 2021-06-19 NOTE — LETTER
Letter by Ismael Hanson RN at      Author: Ismael Hanson RN Service: -- Author Type: --    Filed:  Encounter Date: 11/12/2019 Status: Signed           November 12, 2019    Important [employer] Information    RHONA REESE  [address]  [place] [address]    Your New Care Coordinator  Dear Rhona,  My name is Ismael Hanson RN, PHN and I am your new Care Coordinator. You may reach me by calling [phone]. I will be in touch with you shortly to address any questions you may have.   I have also been in contact with GOPAL GERMAN, your previous care coordinator, to ensure a smooth transition.  Questions?  Call me at [phone] Monday-Friday between 8am and 5pm. TTY/TDD: 614. I look forward to working with you as a [employer]  member.  Sincerely,    Ismael Hanson RN, PHN    E-mail: [email or web address]  Phone: [phone]    Care Manager  [facility]        cc: member records                                    Civil Rights Notice  Discrimination is against the law. [employer] does not discriminate on the basis of any of the following:    Race    Color    National Origin    Creed    Congregation    Age    Public Assistance Status    Receipt of Health Care Services    Disability (including physical or mental impairment)    Sex (including sex stereotypes and gender identity)    Marital Status    Political Beliefs    Medical Condition    Genetic Information    Sexual Orientation    Claims Experience    Medical History    Health Status    Auxiliary Aids and Services:  [employer] provides auxiliary aids and services, like qualified interpreters or information in accessible formats, free of charge and in a timely manner, to ensure an equal opportunity to participate in our health care programs. Contact [employer] Customer Service at [employer]/contact medicaid or call [phone] (toll free); TTY:711 or at [employer]/contactmedicaid.    Language Assistance Services:  [employer] provides translated  documents and spoken language interpreting, free of charge and in a timely manner, when language assistance services are necessary to ensure limited English speakers have meaningful access to our information and services. Contact [employer] at [phone] (toll free); TTY: 711 or [email or web address]/contactmedicaid.     Civil Rights Complaints  You have the right to file a discrimination complaint if you believe you were treated in a discriminatory way by [employer]. You may contact any of the following four agencies directly to file a discrimination complaint.    U.S. Department of Health and Human Services Office for Civil Rights (OCR)  You have the right to file a complaint with the OCR, a federal agency, if you believe you have been discriminated against because of any of the following:    Race    Disability    Color    Sex    National Origin    Age      Contact the OCR directly to file a complaint:         Director         U.S. Department of Health and Human Services Office for Civil Rights         [address] [place] [address]         [phone] (Voice)         [phone] (TDD)         Complaint Portal - https://ocrportal.hhs.gov/ocr/portal/lobby.jsf     [place] Department of Human Rights (MDHR)  In [place], you have the right to file a complaint with the MDHR if you believe you have been discriminated against because of any of the following:      Race    Color    National Origin    Mu-ism    Creed    Sex    Sexual Orientation    Marital Status    Public Assistance Status    Disability    Contact the MD directly to file a complaint:         [place] Department of Human Rights         [address] [address]         [phone] (voice)          [phone] (toll free)         711 or [phone] ([place] Relay)         [phone] (Fax)         [email or web address] (Email)     [place] Department of Human  Services (DHS)  You have the right to file a complaint with [facility] if you believe you have been discriminated against in our health care programs because of any of the following:    Race    Color    National Origin    Creed    Zoroastrian    Age    Public Assistance Status    Receipt of Health Care Services    Disability (including physical or mental impairment)    Sex (including sex stereotypes and gender identity)    Marital Status    Political Beliefs    Medical Condition    Genetic Information    Sexual Orientation    Claims Experience    Medical History    Health Status    Complaints must be in writing and filed within 180 days of the date you discovered the alleged discrimination. The complaint must contain your name and address and describe the discrimination you are complaining about. After we get your complaint, we will review it and notify you in writing about whether we have authority to investigate. If we do, we will investigate the complaint.      [facility] will notify you in writing of the investigations outcome. You have a right to appeal the outcome if you disagree with the decision. To appeal, you must send a written request to have [facility] review the investigation outcome period. Be brief and state why you disagree with the decision. Include additional information you think is important.      If you file a complaint in this way, the people who work for the agency named in the complaint cannot retaliate against you. This means they cannot punish you in any way for filing a complaint. Filing a complaint in this way does not stop you from seeking out other legal or administration actions.     Contact [facility] directly to file a discrimination complaint:        Civil Rights Coordinator        [place] Department of Human Services        Equal Opportunity and Access Division        [address] [address]        [place] [address] [phone] (voice) or use your preferred relay service     [employer] Complaint Notice   You  have the right to file a complaint with [employer] if you believe you have been discriminated against because of any of the following:       Medical condition    Health status    Receipt of health care services    Claims experience    Medical history    Genetic information    Disability (including mental or physical impairment)    Marital status    Age    Sex (including sex stereotypes and gender identity)    Sexual orientation    National origin    Race    Color    Restorationism    Creed    Public assistance status    Political beliefs    You can file a complaint and ask for help in filing a complaint in person or by mail, phone, fax, or email at:     [employer] Civil Rights Coordinator  [employer]  [address], [place] [address] [phone] (voice and fax) or TMJ:879  Email: [email or web address]    American Indians can continue to use [facility] and [facility] ([facility]) clinics. We will not require prior approval or impose any conditions for you to get services at these clinics. For elders age 65 years and older this includes Elderly Waiver (EW) services accessed through the [place]. If a doctor or other provider in a [facility] or [facility] clinic refers you to a provider in our network, we will not require you to see your primary care provider prior to the referral.    For accessible formats of this publication or assistance with equal or access to our services, visit [employer]/contactmedicaid, or call [phone] (toll free) or use your preferred relay service.

## 2021-06-19 NOTE — LETTER
Letter by Ismael Hanson RN at      Author: Ismael Hanson RN Service: -- Author Type: --    Filed:  Encounter Date: 5/14/2019 Status: (Other)       May 14, 2019    Important [employer] Information    RHONA REESE  [address]  [place] [address]  Your Care Plan  Dear Rhona,  When we spoke recently, I promised to send you a Care Plan. The plan enclosed is a summary of our discussion. It includes the steps we agreed would help you meet your health goals. In addition, I can help you with:  [employer]  This program is available to members who need a ride to medical and dental visits. To schedule a ride, call [phone] or [phone] (toll free). TTY/TTD: 711. You can call 8 a.m. to 8 p.m. Seven days a week. Access to a representative may be limited at times.      [employer]   The [employer] program empowers you to improve your health through education and exercise. To learn more, visit [employer], or call [employer] Customer Service at [phone] (toll free) (TTY:711) from 7 a.m. - 7 p.m. Central Time, Monday-Friday.  Health Care Directive   This form helps you outline your health care wishes. You can request a form from me and I will answer any questions you have before you discuss it with your doctor.   Annual Physical  Take a key step on your path to good health and set up an annual physical at your clinic.  Questions?  Call me at [phone] Monday-Friday between 8am and 5pm.  TTY/TTD: 711. As we discussed, I plan to be in touch with you again in 6 months to follow up via phone.  Sincerely,    Ismael Hanson RN, PHN    E-mail: [email or web address]  Phone: [phone]    Care Manager  [facility]          cc: member records                Civil Rights Notice  Discrimination is against the law. [employer] does not discriminate on the basis of any of the following:    Race    Color    National Origin    Creed    Jewish    Age    Public Assistance Status    Receipt  of Health Care Services    Disability (including physical or mental impairment)    Sex (including sex stereotypes and gender identity)    Marital Status    Political Beliefs    Medical Condition    Genetic Information    Sexual Orientation    Claims Experience    Medical History    Health Status    Auxiliary Aids and Services:  [employer] provides auxiliary aids and services, like qualified interpreters or information in accessible formats, free of charge and in a timely manner, to ensure an equal opportunity to participate in our health care programs. Contact [employer] Customer Service at [employer]/contact medicaid or call [phone] (toll free); TTY:717 or at [employer]/contactmedicaid.    Language Assistance Services:  [employer] provides translated documents and spoken language interpreting, free of charge and in a timely manner, when language assistance services are necessary to ensure limited English speakers have meaningful access to our information and services. Contact [employer] at [phone] (toll free); TTY: 499 or [employer]/contactmedicaid.     Civil Rights Complaints  You have the right to file a discrimination complaint if you believe you were treated in a discriminatory way by [employer]. You may contact any of the following four agencies directly to file a discrimination complaint.    U.S. Department of Health and Human Services Office for Civil Rights (OCR)  You have the right to file a complaint with the OCR, a federal agency, if you believe you have been discriminated against because of any of the following:    Race    Disability    Color    Sex    National Origin    Age      Contact the OCR directly to file a complaint:         Director         U.S. Department of Health and Human Services Office for Civil Rights         [address] [place] [address], [place] [address] [phone] (Voice)         [phone] (TDD)         Complaint Portal -  https://ocrportal.hhs.gov/ocr/portal/lobby.jsf     [place] Department of Human Rights (MDHR)  In [place], you have the right to file a complaint with the MDHR if you believe you have been discriminated against because of any of the following:      Race    Color    National Origin    Worship    Creed    Sex    Sexual Orientation    Marital Status    Public Assistance Status    Disability    Contact the MDHR directly to file a complaint:         [place] Department of Human Rights         [facility], [address] [place] [address]         [phone] (voice)          [phone] (toll free)         716 or [phone] ([place] Relay)         [phone] (Fax)         [email or web address] (Email)     [place] Department of Human Services (DHS)  You have the right to file a complaint with [facility] if you believe you have been discriminated against in our health care programs because of any of the following:    Race    Color    National Origin    Creed    Worship    Age    Public Assistance Status    Receipt of Health Care Services    Disability (including physical or mental impairment)    Sex (including sex stereotypes and gender identity)    Marital Status    Political Beliefs    Medical Condition    Genetic Information    Sexual Orientation    Claims Experience    Medical History    Health Status    Complaints must be in writing and filed within 180 days of the date you discovered the alleged discrimination. The complaint must contain your name and address and describe the discrimination you are complaining about. After we get your complaint, we will review it and notify you in writing about whether we have authority to investigate. If we do, we will investigate the complaint.      [facility] will notify you in writing of the investigations outcome. You have a right to appeal the outcome if you disagree with the decision. To appeal, you must send a written request to have [facility] review the  investigation outcome period. Be brief and state why you disagree with the decision. Include additional information you think is important.      If you file a complaint in this way, the people who work for the agency named in the complaint cannot retaliate against you. This means they cannot punish you in any way for filing a complaint. Filing a complaint in this way does not stop you from seeking out other legal or administration actions.     Contact DHS directly to file a discrimination complaint:        Civil Rights Coordinator        [place] Department of Human Services        Equal Opportunity and Access Division        [address] [address]        [place] [address] [phone] (voice) or use your preferred relay service     [employer] Complaint Notice   You have the right to file a complaint with [employer] if you believe you have been discriminated against because of any of the following:       Medical condition    Health status    Receipt of health care services    Claims experience    Medical history    Genetic information    Disability (including mental or physical impairment)    Marital status    Age    Sex (including sex stereotypes and gender identity)    Sexual orientation    National origin    Race    Color    Zoroastrian    Creed    Public assistance status    Political beliefs    You can file a complaint and ask for help in filing a complaint in person or by mail, phone, fax, or email at:     [employer] Civil Rights Coordinator  [facility]  [address], [place] [address] [phone] (voice and fax) or TTU:507  Email: [email or web address]    American Indians can continue to use [facility] and [facility] ([facility]) clinics. We will not require prior approval or impose any conditions for you to get services at these clinics. For elders age 65 years and older this includes Elderly Waiver (EW) services accessed through the [place]. If a doctor or other  provider in a [facility] or [facility] clinic refers you to a provider in our network, we will not require you to see your primary care provider prior to the referral.    For accessible formats of this publication or assistance with equal or access to our services, visit [employer] Health Solutionsmedicaid, or call [phone] (toll free) or use your preferred relay service.

## 2021-06-20 NOTE — LETTER
Letter by Ismael Hanson RN at      Author: Ismael Hanson RN Service: -- Author Type: --    Filed:  Encounter Date: 9/16/2020 Status: (Other)       September 16, 2020    Important [employer] Information    RHONA REESE  [address] [facility] [address]  [place] [address]  Your Care Plan  Dear Rhona,  When we spoke recently, I promised to send you a Care Plan. The plan enclosed is a summary of our discussion. It includes the steps we agreed would help you meet your health goals. In addition, I can help you with:  [id number]  This program is available to members who need a ride to medical and dental visits. To schedule a ride, call [phone] or [phone] (toll free). TTY/TTD: 711. You can call 8 a.m. to 8 p.m. Seven days a week. Access to a representative may be limited at times.      Yeelink   The Yeelink program empowers you to improve your health through education and exercise. To learn more, visit [employer], or call [employer] Customer Service at [phone] (toll free) (TTY:711) from 7 a.m. - 7 p.m. Central Time, Monday-Friday.  Health Care Directive   This form helps you outline your health care wishes. You can request a form from me and I will answer any questions you have before you discuss it with your doctor.   Annual Physical  Take a key step on your path to good health and set up an annual physical at your clinic.  Questions?  Call me at [phone] Monday-Friday between 8am and 5pm.  TTY/TTD: 711. As we discussed, I plan to be in touch with you again in 6 months to follow up via phone.  Sincerely,    Ismael Hanson RN, PHN    E-mail: [email or web address]  Phone: [phone]    Care Manager  [employer]          cc: member records                Civil Rights Notice  Discrimination is against the law. [employer] does not discriminate on the basis of any of the following:    Race    Color    National Origin    Creed    Yazidi    Age    Public Assistance  Status    Receipt of Health Care Services    Disability (including physical or mental impairment)    Sex (including sex stereotypes and gender identity)    Marital Status    Political Beliefs    Medical Condition    Genetic Information    Sexual Orientation    Claims Experience    Medical History    Health Status    Auxiliary Aids and Services:  [employer] provides auxiliary aids and services, like qualified interpreters or information in accessible formats, free of charge and in a timely manner, to ensure an equal opportunity to participate in our health care programs. Contact [employer] Customer Service at [place]/contact medicaid or call [phone] (toll free); TTY:71 or at [place]/contactmedicaid.    Language Assistance Services:  [employer] provides translated documents and spoken language interpreting, free of charge and in a timely manner, when language assistance services are necessary to ensure limited English speakers have meaningful access to our information and services. Contact [employer] at [phone] (toll free); TTY: 041 or [place]/contactmedicaid.     Civil Rights Complaints  You have the right to file a discrimination complaint if you believe you were treated in a discriminatory way by [facility]. You may contact any of the following four agencies directly to file a discrimination complaint.    U.S. Department of Health and Human Services Office for Civil Rights (OCR)  You have the right to file a complaint with the OCR, a federal agency, if you believe you have been discriminated against because of any of the following:    Race    Disability    Color    Sex    National Origin    Age      Contact the OCR directly to file a complaint:         Director         U.S. Department of Health and Human Services Office for Civil Rights         [address] [place] [address], [place] [address] [phone] (Voice)         [phone] (TDD)         Complaint  Portal - [email or web address]     [place] Department of Human Rights (MDHR)  In [place], you have the right to file a complaint with the MDHR if you believe you have been discriminated against because of any of the following:      Race    Color    National Origin    Mu-ism    Creed    Sex    Sexual Orientation    Marital Status    Public Assistance Status    Disability    Contact the MDHR directly to file a complaint:         [place] Department of Human Rights         [facility], [address] [place] [address]         [phone] (voice)          [phone] (toll free)         [phone] or [phone] ([place] Relay)         [phone] (Fax)         [email or web address] (Email)     [place] Department of Human Services (DHS)  You have the right to file a complaint with [facility] if you believe you have been discriminated against in our health care programs because of any of the following:    Race    Color    National Origin    Creed    Mu-ism    Age    Public Assistance Status    Receipt of Health Care Services    Disability (including physical or mental impairment)    Sex (including sex stereotypes and gender identity)    Marital Status    Political Beliefs    Medical Condition    Genetic Information    Sexual Orientation    Claims Experience    Medical History    Health Status    Complaints must be in writing and filed within 180 days of the date you discovered the alleged discrimination. The complaint must contain your name and address and describe the discrimination you are complaining about. After we get your complaint, we will review it and notify you in writing about whether we have authority to investigate. If we do, we will investigate the complaint.      [facility] will notify you in writing of the investigations outcome. You have a right to appeal the outcome if you disagree with the decision. To appeal, you must send a written request to have [facility] review the  investigation outcome period. Be brief and state why you disagree with the decision. Include additional information you think is important.      If you file a complaint in this way, the people who work for the agency named in the complaint cannot retaliate against you. This means they cannot punish you in any way for filing a complaint. Filing a complaint in this way does not stop you from seeking out other legal or administration actions.     Contact DHS directly to file a discrimination complaint:        Civil Rights Coordinator        [place] Department of Human Services        Equal Opportunity and Access Division        [address] [address]        [place] [address] [phone] (voice) or use your preferred relay service     [facility] Complaint Notice   You have the right to file a complaint with [employer] if you believe you have been discriminated against because of any of the following:       Medical condition    Health status    Receipt of health care services    Claims experience    Medical history    Genetic information    Disability (including mental or physical impairment)    Marital status    Age    Sex (including sex stereotypes and gender identity)    Sexual orientation    National origin    Race    Color    Amish    Creed    Public assistance status    Political beliefs    You can file a complaint and ask for help in filing a complaint in person or by mail, phone, fax, or email at:     [employer] Civil Rights Coordinator  [facility]  [address], [place] [address] [phone] (voice and fax) or TTS:797  Email: [email or web address]    American Indians can continue to use [facility] and [facility] ([facility]) clinics. We will not require prior approval or impose any conditions for you to get services at these clinics. For elders age 65 years and older this includes Elderly Waiver (EW) services accessed through the [place]. If a doctor or other  provider in a [facility] or [facility] clinic refers you to a provider in our network, we will not require you to see your primary care provider prior to the referral.    For accessible formats of this publication or assistance with equal or access to our services, visit [employer], or call [phone] (toll free) or use your preferred relay service.

## 2021-06-22 NOTE — TELEPHONE ENCOUNTER
Refill Approved    Rx renewed per Medication Renewal Policy. Medication was last renewed on 5.7.18.    Catalina Mcmullen, Care Connection Triage/Med Refill 1/8/2019     Requested Prescriptions   Pending Prescriptions Disp Refills     alendronate (FOSAMAX) 35 MG tablet 4 tablet 5     Sig: TAKE 1 TABLET BY MOUTH WEEKLY    Biphosphonates Refill Protocol Passed - 1/8/2019  1:41 PM       Passed - PCP or prescribing provider visit in last 12 months    Last office visit with prescriber/PCP: 11/2/2017 Cyril Kelly MD OR same dept: 1/9/2018 Hira Henning DO OR same specialty: 1/9/2018 Hira Henning DO  Last physical: 5/7/2018 Last MTM visit: Visit date not found   Next visit within 3 mo: Visit date not found  Next physical within 3 mo: Visit date not found  Prescriber OR PCP: Cyril Kelly MD  Last diagnosis associated with med order: 1. Disorder of bone and cartilage  - alendronate (FOSAMAX) 35 MG tablet; TAKE 1 TABLET BY MOUTH WEEKLY  Dispense: 4 tablet; Refill: 5    2. Senile osteoporosis  - alendronate (FOSAMAX) 35 MG tablet; TAKE 1 TABLET BY MOUTH WEEKLY  Dispense: 4 tablet; Refill: 5    If protocol passes may refill for 12 months if within 3 months of last provider visit (or a total of 15 months).            Passed - Serum creatinine in last 12 months    Creatinine   Date Value Ref Range Status   05/07/2018 0.69 0.60 - 1.10 mg/dL Final

## 2021-06-23 NOTE — TELEPHONE ENCOUNTER
Refill Approved    Rx renewed per Medication Renewal Policy. Medication was last renewed on 5/7/18.    Jeny Kirkpatrick, [facility] Connection Triage/Med Refill 1/25/2019     Requested Prescriptions   Pending Prescriptions Disp Refills     mometasone (NASONEX) 50 mcg/actuation nasal spray 17 g 2     Sig: INSTILL 2 SPRAYS INTO EACH NOSTIL DAILY AS NEEDED    Nasal Steroid Refill Protocol Passed - 1/24/2019 10:43 AM       Passed - Patient has had office visit/physical in last 2 years    Last office visit with prescriber/PCP: 11/2/2017 OR same dept: Visit date not found OR same specialty: 1/9/2018 Hira Henning DO Last physical: 5/7/2018 Last MTM visit: Visit date not found    Next appt within 3 mo: Visit date not found  Next physical within 3 mo: Visit date not found  Prescriber OR PCP: Cyril Kelly MD  Last diagnosis associated with med order: 1. Environmental allergies  - mometasone (NASONEX) 50 mcg/actuation nasal spray; INSTILL 2 SPRAYS INTO EACH NOSTIL DAILY AS NEEDED  Dispense: 17 g; Refill: 2     If protocol passes may refill for 12 months if within 3 months of last provider visit (or a total of 15 months).

## 2021-06-25 NOTE — TELEPHONE ENCOUNTER
RN cannot approve Refill Request    RN can NOT refill this medication PCP messaged that patient is overdue for Office Visit. Last office visit: Visit date not found Last Physical: 10/17/2019 Last MTM visit: Visit date not found Last visit same specialty: 5/29/2019 Ehsan Elizabeth MD.  Next visit within 3 mo: Visit date not found  Next physical within 3 mo: Visit date not found      Florencia Santiago, Care Connection Triage/Med Refill 6/2/2021    Requested Prescriptions   Pending Prescriptions Disp Refills     simvastatin (ZOCOR) 20 MG tablet [Pharmacy Med Name: SIMVASTATIN 20 MG TABLET] 90 tablet 1     Sig: TAKE 1 TABLET BY MOUTH EVERY DAY       Statins Refill Protocol (Hmg CoA Reductase Inhibitors) Failed - 6/2/2021 12:25 AM        Failed - PCP or prescribing provider visit in past 12 months      Last office visit with prescriber/PCP: Visit date not found OR same dept: Visit date not found OR same specialty: 5/29/2019 Ehsan Elizabeth MD  Last physical: 10/17/2019 Last MTM visit: Visit date not found   Next visit within 3 mo: Visit date not found  Next physical within 3 mo: Visit date not found  Prescriber OR PCP: Cyril Foss MD  Last diagnosis associated with med order: 1. Hyperlipemia  - simvastatin (ZOCOR) 20 MG tablet [Pharmacy Med Name: SIMVASTATIN 20 MG TABLET]; TAKE 1 TABLET BY MOUTH EVERY DAY  Dispense: 90 tablet; Refill: 1    If protocol passes may refill for 12 months if within 3 months of last provider visit (or a total of 15 months).

## 2021-07-20 ENCOUNTER — PATIENT OUTREACH (OUTPATIENT)
Dept: GERIATRIC MEDICINE | Facility: CLINIC | Age: 76
End: 2021-07-20

## 2021-07-20 ASSESSMENT — ACTIVITIES OF DAILY LIVING (ADL): DEPENDENT_IADLS:: CLEANING;COOKING;LAUNDRY;MEAL PREPARATION;TRANSPORTATION

## 2021-07-20 NOTE — PROGRESS NOTES
[facility] Care Coordination Contact    [facility] Home Visit Assessment     Health Risk Assessment with Elkin Castaneda completed on July 20, 2021 via 3 way telephone call with [employer] .     Type of residence:: Apartment - handicap accessible  Current living arrangement:: I live alone     Assessment completed with:: Patient, Care Team Member, Other ([employer]  Manuel ID number [id number])    Current Care Plan  Member currently receiving the following home care services:     Member currently receiving the following community resources: adult day care, homemaking services and county worker.      Medication Review  Medication reconciliation completed in Epic: Yes  Medication set-up & administration: Independent and sets up on own weekly.  Self-administers medications.  Medication Risk Assessment Medication (1 or more, place referral to MTM): N/A: No risk factors identified  MTM Referral Placed: No: No risk factors idenified    Mental/Behavioral Health   Depression Screening:   PHQ-2 Total Score (Adult) - Positive if 3 or more points; Administer PHQ-9 if positive: 0       Mental health DX:: Yes (Adjustment discorder)   Mental health DX how managed:: Other, Outpatient Counseling (Group therapy 1x per week at [place] and attending adult day care.)    Falls Assessment:   Fallen 2 or more times in the past year?: No        ADL/IADL Dependencies:   Dependent ADLs:: Ambulation-walker  Dependent IADLs:: Cleaning, Cooking, Laundry, Meal Preparation, Transportation    [facility] Plan sponsored benefits: Shared information re: Silver Sneakers/gym memberships, ASA, Calcium +D.    PCA Assessment completed at visit: Not Applicable     Elderly Waiver Eligibility: Yes-will continue on EW    Care Plan & Recommendations: Elkin will continue to reside safely in her own home with homemaking services & life alert. She goes shopping every Wednesday. Staff at apartment building provides transportation to  "residents. Member would like to increase adult day care days. She states it will help her \"stay happy and emotionally healthy\". She would like a peddle exercise bike. Explained to member that writer will check budget to see if it's allowed for the increase in adult day care days and peddle bike otherwise writer will update her. Member requested to discontinue the life alert because she has not used it since she got it. Writer explained and encouraged member to keep it since she lives alone and has pain in knees. She is in agreement to keep the life alert.     See [facility] for detailed assessment information.    Follow-Up Plan: Member informed of future contact, plan to f/u with member with a 6 month telephone assessment.  Contact information shared with member and encouraged member to call with any questions or concerns at any time.    Ismael Hanson RN, PHN   [facility]  [phone]           "

## 2021-08-18 ENCOUNTER — PATIENT OUTREACH (OUTPATIENT)
Dept: GERIATRIC MEDICINE | Facility: CLINIC | Age: 76
End: 2021-08-18

## 2021-08-18 NOTE — PROGRESS NOTES
Telephone call to [facility] and spoke with Jonn Garrett. Informed him of the increased adult day care days, 4 days per week. Member will have 8 trips of transportation per week.     Called and spoke with Sonia at [facility]. Informed of the same hrs to homemaking services.     Email sent to [facility]. Requested for Pedlar Floor Excerciser.     Ismael Hanson RN, PHN   [facility]  [phone]

## 2021-08-24 NOTE — PROGRESS NOTES
[facility] Care Coordination Contact    Received after visit chart from care coordinator.  Completed following tasks: Mailed copy of care plan to client, Updated services in access and Submitted referrals/auths for adc and hmking   and Provider Signature - No POC Shared:  Member indicates that they do not want their POC shared with any EW providers.     Mailed POC signature page to member to sign and return asap.    Marcos Jimenez  [facility]  Case Management Specialist  [phone]

## 2021-08-26 NOTE — PROGRESS NOTES
Faxed auth for exerciser to [facility].     Marcos Jimenez  [facility]  Case Management Specialist  [phone]

## 2021-09-16 ENCOUNTER — OFFICE VISIT (OUTPATIENT)
Dept: INTERNAL MEDICINE | Facility: CLINIC | Age: 76
End: 2021-09-16
Payer: COMMERCIAL

## 2021-09-16 VITALS
DIASTOLIC BLOOD PRESSURE: 64 MMHG | OXYGEN SATURATION: 98 % | HEIGHT: 59 IN | WEIGHT: 142 LBS | HEART RATE: 60 BPM | BODY MASS INDEX: 28.63 KG/M2 | SYSTOLIC BLOOD PRESSURE: 126 MMHG

## 2021-09-16 DIAGNOSIS — M85.851 OSTEOPENIA OF BOTH HIPS: ICD-10-CM

## 2021-09-16 DIAGNOSIS — G44.219 EPISODIC TENSION-TYPE HEADACHE, NOT INTRACTABLE: ICD-10-CM

## 2021-09-16 DIAGNOSIS — E78.5 HYPERLIPIDEMIA, UNSPECIFIED HYPERLIPIDEMIA TYPE: ICD-10-CM

## 2021-09-16 DIAGNOSIS — Z11.59 NEED FOR HEPATITIS C SCREENING TEST: Primary | ICD-10-CM

## 2021-09-16 DIAGNOSIS — T78.40XD ALLERGIC REACTION, SUBSEQUENT ENCOUNTER: ICD-10-CM

## 2021-09-16 DIAGNOSIS — E78.5 HYPERLIPIDEMIA LDL GOAL <130: ICD-10-CM

## 2021-09-16 DIAGNOSIS — M85.852 OSTEOPENIA OF BOTH HIPS: ICD-10-CM

## 2021-09-16 PROBLEM — M85.859 OSTEOPENIA OF HIP: Status: ACTIVE | Noted: 2021-09-16

## 2021-09-16 PROBLEM — M81.0 OSTEOPOROSIS: Status: RESOLVED | Noted: 2019-03-12 | Resolved: 2021-09-16

## 2021-09-16 LAB
ALBUMIN SERPL-MCNC: 3.9 G/DL (ref 3.5–5)
ALP SERPL-CCNC: 71 U/L (ref 45–120)
ALT SERPL W P-5'-P-CCNC: 18 U/L (ref 0–45)
ANION GAP SERPL CALCULATED.3IONS-SCNC: 11 MMOL/L (ref 5–18)
AST SERPL W P-5'-P-CCNC: 27 U/L (ref 0–40)
BILIRUB SERPL-MCNC: 0.8 MG/DL (ref 0–1)
BUN SERPL-MCNC: 18 MG/DL (ref 8–28)
CALCIUM SERPL-MCNC: 9.4 MG/DL (ref 8.5–10.5)
CHLORIDE BLD-SCNC: 105 MMOL/L (ref 98–107)
CHOLEST SERPL-MCNC: 157 MG/DL
CO2 SERPL-SCNC: 28 MMOL/L (ref 22–31)
CREAT SERPL-MCNC: 0.77 MG/DL (ref 0.6–1.1)
ERYTHROCYTE [DISTWIDTH] IN BLOOD BY AUTOMATED COUNT: 12.8 % (ref 10–15)
ERYTHROCYTE [SEDIMENTATION RATE] IN BLOOD BY WESTERGREN METHOD: 34 MM/HR (ref 0–20)
FASTING STATUS PATIENT QL REPORTED: YES
GFR SERPL CREATININE-BSD FRML MDRD: 74 ML/MIN/1.73M2
GLUCOSE BLD-MCNC: 90 MG/DL (ref 70–125)
HCT VFR BLD AUTO: 39.4 % (ref 35–47)
HDLC SERPL-MCNC: 66 MG/DL
HGB BLD-MCNC: 13 G/DL (ref 11.7–15.7)
LDLC SERPL CALC-MCNC: 70 MG/DL
MCH RBC QN AUTO: 30.2 PG (ref 26.5–33)
MCHC RBC AUTO-ENTMCNC: 33 G/DL (ref 31.5–36.5)
MCV RBC AUTO: 91 FL (ref 78–100)
PLATELET # BLD AUTO: 190 10E3/UL (ref 150–450)
POTASSIUM BLD-SCNC: 3.5 MMOL/L (ref 3.5–5)
PROT SERPL-MCNC: 7.2 G/DL (ref 6–8)
RBC # BLD AUTO: 4.31 10E6/UL (ref 3.8–5.2)
SODIUM SERPL-SCNC: 144 MMOL/L (ref 136–145)
TRIGL SERPL-MCNC: 103 MG/DL
WBC # BLD AUTO: 6 10E3/UL (ref 4–11)

## 2021-09-16 PROCEDURE — 80053 COMPREHEN METABOLIC PANEL: CPT | Performed by: INTERNAL MEDICINE

## 2021-09-16 PROCEDURE — 85027 COMPLETE CBC AUTOMATED: CPT | Performed by: INTERNAL MEDICINE

## 2021-09-16 PROCEDURE — 85652 RBC SED RATE AUTOMATED: CPT | Performed by: INTERNAL MEDICINE

## 2021-09-16 PROCEDURE — 80061 LIPID PANEL: CPT | Performed by: INTERNAL MEDICINE

## 2021-09-16 PROCEDURE — 36415 COLL VENOUS BLD VENIPUNCTURE: CPT | Performed by: INTERNAL MEDICINE

## 2021-09-16 PROCEDURE — 99214 OFFICE O/P EST MOD 30 MIN: CPT | Performed by: INTERNAL MEDICINE

## 2021-09-16 PROCEDURE — 86803 HEPATITIS C AB TEST: CPT | Performed by: INTERNAL MEDICINE

## 2021-09-16 RX ORDER — OMEGA-3 FATTY ACIDS/FISH OIL 300-1000MG
200 CAPSULE ORAL EVERY 4 HOURS PRN
COMMUNITY

## 2021-09-16 RX ORDER — MOMETASONE FUROATE MONOHYDRATE 50 UG/1
SPRAY, METERED NASAL
Qty: 17 G | Refills: 7 | Status: SHIPPED | OUTPATIENT
Start: 2021-09-16 | End: 2022-03-15

## 2021-09-16 ASSESSMENT — MIFFLIN-ST. JEOR: SCORE: 1021.8

## 2021-09-16 NOTE — PATIENT INSTRUCTIONS
1. Take aspirin 81 mg by mouth every evening.     2. Can use ibuprofen 200 mg by mouth as needed for headache    3. Blood tests today.     4. Will schedule CT head to evaluate headaches.     5. Follow up in one month.

## 2021-09-16 NOTE — PROGRESS NOTES
ASSESSMENT AND PLAN:    1. Need for hepatitis C screening test  ordered    2. Hyperlipidemia, unspecified hyperlipidemia type  Continue present statin therapy    3. Osteopenia of both hips  Ongoing vitamin D and calcium supplementation    4. Episodic tension-type headache, not intractable  Atypical headache suggestive of caffeine effects, resolving soon after AM coffee.  Yet neuro exam reveals an element of apraxic slowness with some activities.  No focal changes.  She worries about 'circulation to my brain'.  We discussed taking aspirin 81 mg daily at HS, and minimizing caffeine after lunch.  Will get CT brain as well.     Patient Instructions   1. Take aspirin 81 mg by mouth every evening.     2. Can use ibuprofen 200 mg by mouth as needed for headache    3. Blood tests today.     4. Will schedule CT head to evaluate headaches.     5. Follow up in one month.     CHIEF COMPLAINT:  Headache    HISTORY OF PRESENT ILLNESS:  Elkin Castaneda is a 76 year old female here with AM headaches.  Dull and pressure localized over the right frontal and lateral head areas.  No nausea or visual disturbance, has not noted focal neurologic symptoms.  No confusion or unusual memory impairment.  Has not been ill or had unusual dyspnea or cough or fever, or changes in bowel or bladder function.      REVIEW OF SYSTEMS:   See HPI, all other systems on review are negative.    Past Medical History:   Diagnosis Date     Osteopenia of hip 2021     KENRICK (stress urinary incontinence, female) 2021     History   Smoking Status     Never Smoker   Smokeless Tobacco     Never Used     Family History   Problem Relation Age of Onset     No Known Problems Mother      No Known Problems Father      Breast Cancer No family hx of      Past Surgical History:   Procedure Laterality Date      SECTION       CHOLECYSTECTOMY       HYSTERECTOMY       VITALS:  Vitals:    21 0701   BP: 126/64   BP Location: Left arm   Patient Position:  "Sitting   Cuff Size: Adult Small   Pulse: 60   SpO2: 98%   Weight: 64.4 kg (142 lb)   Height: 1.486 m (4' 10.5\")     Wt Readings from Last 3 Encounters:   09/16/21 64.4 kg (142 lb)   10/20/20 64.4 kg (142 lb)   10/17/19 60.9 kg (134 lb 3 oz)     PHYSICAL EXAM:  Constitutional:  In NAD, alert and oriented  HEENT: nose and throat clear  EYE: fundi are unremarkable, discs flat  Neck: no cervical or axillary adenopathy  Cardiac:  S1 S2   Lungs: Clear   Abdomen:   Soft, flat and non-tender    Extremities: no joint effusion, no significant edema  Neurologic:  Speech clear, no arm or leg weakness, gait normal fpr age, Romberg is negative.  Some apraxia with F to N and walking on heels and toes     Psychiatric:  Mood and behavior appropriate, thinking is clear.     DECISION TO OBTAIN OLD RECORDS AND/OR OBTAIN HISTORY FROM SOMEONE OTHER THAN PATIENT, AND/OR ACCESSING CARE EVERYWHERE):  1  0     REVIEW AND SUMMARIZATION OF OLD RECORDS, AND/OR OBTAINING HISTORY FROM SOMEONE OTHER THAN PATIENT, AND/OR DISCUSSION OF CASE WITH ANOTHER HEALTH CARE PROVIDER:  2 0    REVIEW AND/OR ORDER OF OF CLINICAL LAB TESTS: 1  ordered.    REVIEW AND/OR ORDER OF RADIOLOGY TESTS: 1 ordered head CT.    REVIEW AND/OR ORDER OF MEDICAL TESTS (EKG/ECHO/COLONOSCOPY/EGD): 1  0    INDEPENDENT  VISUALIZATION OF IMAGE, TRACING, OR SPECIMEN ITSELF (2 EACH):  0     TOTAL: 2    Current Outpatient Medications   Medication Sig Dispense Refill     cholecalciferol, vitamin D3, (VITAMIN D3) 2,000 unit capsule [CHOLECALCIFEROL, VITAMIN D3, (VITAMIN D3) 2,000 UNIT CAPSULE] Take 1,000 Units by mouth daily.       ibuprofen (ADVIL/MOTRIN) 200 MG capsule Take 200 mg by mouth every 4 hours as needed for fever       mometasone (NASONEX) 50 mcg/actuation nasal spray [MOMETASONE (NASONEX) 50 MCG/ACTUATION NASAL SPRAY] INSTILL 2 SPRAYS INTO EACH NOSTIL DAILY AS NEEDED 17 g 7     simvastatin (ZOCOR) 20 MG tablet [SIMVASTATIN (ZOCOR) 20 MG TABLET] TAKE 1 TABLET BY MOUTH EVERY " DAY 90 tablet 1     Cyril Foss MD  Internal Medicine  [facility]

## 2021-09-17 LAB — HCV AB SERPL QL IA: NONREACTIVE

## 2021-09-20 ENCOUNTER — TELEPHONE (OUTPATIENT)
Dept: INTERNAL MEDICINE | Facility: CLINIC | Age: 76
End: 2021-09-20

## 2021-09-20 NOTE — TELEPHONE ENCOUNTER
Reason for Call:  Other call back    Detailed comments: [facility] patient BP which has been running 160's for the past few months - these have been checked between 10 and noon    Please advise       Phone Number Patient can be reached at: Other phone number:  [phone]    Best Time: anytime    Can we leave a detailed message on this number? YES    Call taken on 9/20/2021 at 1:42 PM by Dorota Linares

## 2021-10-09 ENCOUNTER — HOSPITAL ENCOUNTER (OUTPATIENT)
Dept: CT IMAGING | Facility: HOSPITAL | Age: 76
Discharge: HOME | End: 2021-10-09
Attending: INTERNAL MEDICINE | Admitting: INTERNAL MEDICINE
Payer: COMMERCIAL

## 2021-10-09 DIAGNOSIS — G44.219 EPISODIC TENSION-TYPE HEADACHE, NOT INTRACTABLE: ICD-10-CM

## 2021-10-09 PROCEDURE — 70450 CT HEAD/BRAIN W/O DYE: CPT

## 2021-10-19 NOTE — PROGRESS NOTES
Per ABELINO, exerciser delivered 10/1/21.    Marcos Jimenez  [facility]  Case Management Specialist  [phone]

## 2021-11-02 ENCOUNTER — TELEPHONE (OUTPATIENT)
Dept: INTERNAL MEDICINE | Facility: CLINIC | Age: 76
End: 2021-11-02

## 2021-11-03 ENCOUNTER — APPOINTMENT (OUTPATIENT)
Dept: INTERPRETER SERVICES | Facility: CLINIC | Age: 76
End: 2021-11-03
Payer: COMMERCIAL

## 2021-11-03 NOTE — TELEPHONE ENCOUNTER
Patient scheduled for 11/8/21 at 10:30 with Dr. Foss via .  
Reason for Call:  Appointment needed  back pain/left side/4 days/ comes and goes pain rating is a 9    needed for scheduling with return call    PCP: Cyril Foss    Can we leave a detailed message on this number? NO    Phone number patient can be reached at: Home number on file [phone] (home)    Call taken on 11/2/2021 at 1:38 PM by Carolyn Irving    
no abdominal pain, no bloating, no constipation, no diarrhea, no nausea and no vomiting.

## 2021-11-08 ENCOUNTER — OFFICE VISIT (OUTPATIENT)
Dept: INTERNAL MEDICINE | Facility: CLINIC | Age: 76
End: 2021-11-08
Payer: COMMERCIAL

## 2021-11-08 VITALS
DIASTOLIC BLOOD PRESSURE: 60 MMHG | WEIGHT: 141.4 LBS | OXYGEN SATURATION: 98 % | SYSTOLIC BLOOD PRESSURE: 130 MMHG | BODY MASS INDEX: 29.05 KG/M2 | HEART RATE: 60 BPM

## 2021-11-08 DIAGNOSIS — M54.50 CHRONIC MIDLINE LOW BACK PAIN WITHOUT SCIATICA: ICD-10-CM

## 2021-11-08 DIAGNOSIS — G89.29 CHRONIC MIDLINE LOW BACK PAIN WITHOUT SCIATICA: ICD-10-CM

## 2021-11-08 DIAGNOSIS — E78.5 HYPERLIPIDEMIA, UNSPECIFIED HYPERLIPIDEMIA TYPE: Primary | ICD-10-CM

## 2021-11-08 DIAGNOSIS — M85.852 OSTEOPENIA OF BOTH HIPS: ICD-10-CM

## 2021-11-08 DIAGNOSIS — M85.851 OSTEOPENIA OF BOTH HIPS: ICD-10-CM

## 2021-11-08 DIAGNOSIS — Z12.11 SCREENING FOR COLON CANCER: ICD-10-CM

## 2021-11-08 PROCEDURE — 99214 OFFICE O/P EST MOD 30 MIN: CPT | Performed by: INTERNAL MEDICINE

## 2021-11-08 RX ORDER — LIDOCAINE 4 G/G
1 PATCH TOPICAL EVERY 24 HOURS
Qty: 5 PATCH | Refills: 11 | Status: SHIPPED | OUTPATIENT
Start: 2021-11-08

## 2021-11-08 NOTE — PATIENT INSTRUCTIONS
1. Take one tums daily.     2. Continue seeing chiropractor    3. Try lidocaine patch.      4. Follow up in 6 months and as needed.

## 2021-11-08 NOTE — PROGRESS NOTES
ASSESSMENT AND PLAN:    1. Hyperlipidemia  Recent testing, good control.     2. Osteopenia of both hips  Urged to use the TUMS one daily with vitamin D.     3. Screening for colon cancer  Cologard, 2019 is negative.     4. Chronic midline low back pain without sciatica  1 week of midline pain, after some lifting, no symptoms of radiculopathy, going to chiropractic.  I explained that the lidocaine patch may not be covered.  She declined xray today.   - Lidocaine (LIDOCARE) 4 % Patch; Place 1 patch onto the skin every 24 hours To prevent lidocaine toxicity, patient should be patch free for 12 hrs daily.  Dispense: 5 patch; Refill: 11    Patient Instructions   1. Take one tums daily.     2. Continue seeing chiropractor    3. Try lidocaine patch.      4. Follow up in 6 months and as needed.     CHIEF COMPLAINT:  Back pain, and follow up.     HISTORY OF PRESENT ILLNESS:  Elkin Castaneda is a 76 year old female hurt her back about one week ago lifting.  Going to chiropractor with slow improvement.  Would like to try lidocaine patch, a friend is using it.  No leg radiation, and is able to ambulate independently.  No recent illness, or cough, or dyspnea or new bowel or bladder symptoms.  Not taking oral calcium.     REVIEW OF SYSTEMS:   See HPI, all other systems on review are negative.    Past Medical History:   Diagnosis Date     Osteopenia of hip 2021     KENRICK (stress urinary incontinence, female) 2021     History   Smoking Status     Never Smoker   Smokeless Tobacco     Never Used     Family History   Problem Relation Age of Onset     No Known Problems Mother      No Known Problems Father      Breast Cancer No family hx of      Past Surgical History:   Procedure Laterality Date      SECTION       CHOLECYSTECTOMY       HYSTERECTOMY       VITALS:  Vitals:    21 1039   BP: 130/60   BP Location: Left arm   Patient Position: Sitting   Cuff Size: Adult Regular   Pulse: 60   SpO2: 98%   Weight: 64.1 kg  (141 lb 6.4 oz)     Wt Readings from Last 3 Encounters:   11/08/21 64.1 kg (141 lb 6.4 oz)   09/16/21 64.4 kg (142 lb)   10/20/20 64.4 kg (142 lb)     PHYSICAL EXAM:  Constitutional:  In NAD, alert and oriented, ambulation in exam room is independent  Cardiac:  S1 S2   Lungs: Clear  Abdomen:   Soft, flat and non-tender  Back:  Some bilateral lower paralumbar muscle spasm and tenderness  Extremities: no joint effusion, no significant edema  Neurologic:  Speech clear, no focal arm or leg weakness, gait is slow but normal       DECISION TO OBTAIN OLD RECORDS AND/OR OBTAIN HISTORY FROM SOMEONE OTHER THAN PATIENT, AND/OR ACCESSING CARE EVERYWHERE):  1  0     REVIEW AND SUMMARIZATION OF OLD RECORDS, AND/OR OBTAINING HISTORY FROM SOMEONE OTHER THAN PATIENT, AND/OR DISCUSSION OF CASE WITH ANOTHER HEALTH CARE PROVIDER:  2 reviewed past health evaluation    REVIEW AND/OR ORDER OF OF CLINICAL LAB TESTS: 1  Reviewed recent lab results.    REVIEW AND/OR ORDER OF RADIOLOGY TESTS: 1 reviewed last Dexa scan results    REVIEW AND/OR ORDER OF MEDICAL TESTS (EKG/ECHO/COLONOSCOPY/EGD): 1  0    INDEPENDENT  VISUALIZATION OF IMAGE, TRACING, OR SPECIMEN ITSELF (2 EACH):  0     TOTAL: 4    Current Outpatient Medications   Medication Sig Dispense Refill     cholecalciferol, vitamin D3, (VITAMIN D3) 2,000 unit capsule [CHOLECALCIFEROL, VITAMIN D3, (VITAMIN D3) 2,000 UNIT CAPSULE] Take 1,000 Units by mouth daily.       ibuprofen (ADVIL/MOTRIN) 200 MG capsule Take 200 mg by mouth every 4 hours as needed for fever       Lidocaine (LIDOCARE) 4 % Patch Place 1 patch onto the skin every 24 hours To prevent lidocaine toxicity, patient should be patch free for 12 hrs daily. 5 patch 11     mometasone (NASONEX) 50 MCG/ACT nasal spray [MOMETASONE (NASONEX) 50 MCG/ACTUATION NASAL SPRAY] INSTILL 2 SPRAYS INTO EACH NOSTIL DAILY AS NEEDED 17 g 7     simvastatin (ZOCOR) 20 MG tablet [SIMVASTATIN (ZOCOR) 20 MG TABLET] TAKE 1 TABLET BY MOUTH EVERY DAY 90  tablet 1     Cyril Foss MD  Internal Medicine  [facility]

## 2021-11-09 ASSESSMENT — PATIENT HEALTH QUESTIONNAIRE - PHQ9: SUM OF ALL RESPONSES TO PHQ QUESTIONS 1-9: 0

## 2021-11-11 ENCOUNTER — PATIENT OUTREACH (OUTPATIENT)
Dept: GERIATRIC MEDICINE | Facility: CLINIC | Age: 76
End: 2021-11-11
Payer: COMMERCIAL

## 2021-11-11 NOTE — PROGRESS NOTES
[facility] Care Coordination Contact    Received voice mail from Bianca Lenz at [facility], in need for adult day care service agreement.     Returned call, Bianca Lenz states they spoke with [facility] staff and already received the needed service agreement.    Ismael Hanson RN, PHN   [facility]  [phone]

## 2022-02-10 DIAGNOSIS — Z53.9 DIAGNOSIS NOT YET DEFINED: Primary | ICD-10-CM

## 2022-02-10 PROCEDURE — G0180 MD CERTIFICATION HHA PATIENT: HCPCS | Performed by: INTERNAL MEDICINE

## 2022-02-25 ENCOUNTER — PATIENT OUTREACH (OUTPATIENT)
Dept: GERIATRIC MEDICINE | Facility: CLINIC | Age: 77
End: 2022-02-25
Payer: COMMERCIAL

## 2022-02-25 NOTE — PROGRESS NOTES
[facility] Care Coordination Contact      [facility] Six-Month Telephone Assessment    6 month telephone assessment completed on 2/25/22.    ER visits: No  Hospitalizations: No  TCU stays: No  Significant health status changes: No  Falls/Injuries: No  ADL/IADL changes: No  Changes in services: No    Caregiver Assessment follow up:  NA    Goals: See POC in chart for goal progress documentation.      Will see member in 6 months for an annual health risk assessment.   Encouraged member to call CC with any questions or concerns in the meantime.     Ismael Hanson RN, PHN   [facility]  [phone]            
no

## 2022-03-18 DIAGNOSIS — E78.5 HYPERLIPEMIA: ICD-10-CM

## 2022-03-20 RX ORDER — SIMVASTATIN 20 MG
TABLET ORAL
Qty: 90 TABLET | Refills: 3 | Status: SHIPPED | OUTPATIENT
Start: 2022-03-20 | End: 2022-03-22

## 2022-03-20 NOTE — TELEPHONE ENCOUNTER
"Routing refill request to provider for review/approval because:  A break in medication    Last Written Prescription Date:    simvastatin (ZOCOR) 20 MG tablet 90 tablet 1 6/3/2021  No   Sig: [SIMVASTATIN (ZOCOR) 20 MG TABLET] TAKE 1 TABLET BY MOUTH EVERY DAY   Class: Normal   Order: [id number]       Last office visit provider:  11/8/21     Requested Prescriptions   Pending Prescriptions Disp Refills     simvastatin (ZOCOR) 20 MG tablet 90 tablet 1       Statins Protocol Passed - 3/18/2022  2:39 PM        Passed - LDL on file in past 12 months     Recent Labs   Lab Test 09/16/21  0748   LDL 70             Passed - No abnormal creatine kinase in past 12 months     No lab results found.             Passed - Recent (12 mo) or future (30 days) visit within the authorizing provider's specialty     Patient has had an office visit with the authorizing provider or a provider within the authorizing providers department within the previous 12 mos or has a future within next 30 days. See \"Patient Info\" tab in inbasket, or \"Choose Columns\" in Meds & Orders section of the refill encounter.              Passed - Medication is active on med list        Passed - Patient is age 18 or older        Passed - No active pregnancy on record        Passed - No positive pregnancy test in past 12 months             Ashlie Carrasquillo RN 03/20/22 12:34 PM  "

## 2022-03-22 ENCOUNTER — VIRTUAL VISIT (OUTPATIENT)
Dept: FAMILY MEDICINE | Facility: CLINIC | Age: 77
End: 2022-03-22
Payer: COMMERCIAL

## 2022-03-22 DIAGNOSIS — Z13.6 CARDIOVASCULAR SCREENING; LDL GOAL LESS THAN 130: ICD-10-CM

## 2022-03-22 DIAGNOSIS — E78.5 HYPERLIPIDEMIA LDL GOAL <100: ICD-10-CM

## 2022-03-22 DIAGNOSIS — M54.50 CHRONIC MIDLINE LOW BACK PAIN WITHOUT SCIATICA: Primary | ICD-10-CM

## 2022-03-22 DIAGNOSIS — G89.29 CHRONIC MIDLINE LOW BACK PAIN WITHOUT SCIATICA: Primary | ICD-10-CM

## 2022-03-22 DIAGNOSIS — R70.0 ELEVATED ERYTHROCYTE SEDIMENTATION RATE: ICD-10-CM

## 2022-03-22 PROCEDURE — 99214 OFFICE O/P EST MOD 30 MIN: CPT | Mod: 95 | Performed by: NURSE PRACTITIONER

## 2022-03-22 RX ORDER — SIMVASTATIN 20 MG
TABLET ORAL
Qty: 90 TABLET | Refills: 3 | Status: SHIPPED | OUTPATIENT
Start: 2022-03-22

## 2022-03-22 ASSESSMENT — PAIN SCALES - GENERAL: PAINLEVEL: EXTREME PAIN (8)

## 2022-03-22 NOTE — PROGRESS NOTES
"Elkin is a 76 year old who is being evaluated via a billable telephone visit.      Chief Complaint   Patient presents with     Lipids     What phone number would you like to be contacted at? [phone]  How would you like to obtain your AVS? Mail a copy    Assessment & Plan     Chronic midline low back pain without sciatica  Chronic - using over the counter therapies   Follow-up with PCP to discuss    - Comprehensive metabolic panel (BMP + Alb, Alk Phos, ALT, AST, Total. Bili, TP)    Elevated erythrocyte sedimentation rate  Previously elevated - no concern as no new myopathies.  Recheck    - Comprehensive metabolic panel (BMP + Alb, Alk Phos, ALT, AST, Total. Bili, TP)  - ESR: Erythrocyte sedimentation rate    CARDIOVASCULAR SCREENING; LDL GOAL LESS THAN 130     - simvastatin (ZOCOR) 20 MG tablet  Dispense: 90 tablet; Refill: 3  - Lipid panel reflex to direct LDL Fasting    Hyperlipidemia LDL goal <100  Due for Lipids in Sept. Ordered .  Come fasting.    - Lipid panel reflex to direct LDL Fasting  - Comprehensive metabolic panel (BMP + Alb, Alk Phos, ALT, AST, Total. Bili, TP)            BMI:   Estimated body mass index is 29.05 kg/m  as calculated from the following:    Height as of 9/16/21: 1.486 m (4' 10.5\").    Weight as of 11/8/21: 64.1 kg (141 lb 6.4 oz).       See Patient Instructions    No follow-ups on file.    Aide Lanza NP  [facility]    Subjective   Elkin is a 76 year old who presents for the following health issues  accompanied by her .    HPI   Having pain in her joints ,chest ,back and arms   Hyperlipidemia Follow-Up      Are you regularly taking any medication or supplement to lower your cholesterol?   Yes- prescription ,out of medication x 2 days    Are you having muscle aches or other side effects that you think could be caused by your cholesterol lowering medication?  No      How many servings of fruits and vegetables do you eat daily?  2-3    On " average, how many sweetened beverages do you drink each day (Examples: soda, juice, sweet tea, etc.  Do NOT count diet or artificially sweetened beverages)?   0    How many days per week do you exercise enough to make your heart beat faster? 3 or less    How many minutes a day do you exercise enough to make your heart beat faster? 9 or less    How many days per week do you miss taking your medication? Once a month    Reports some pain in unilateral knee and low back chronic - but denies bilateral muscle aches.  No new chest pain or shortness of breath.  Otherwise feeling good.  Did have single ESR elevation.  Last lab was done in Sept and looked good - HDL and LDL normal.   No concerns today.  Communicated via interpretor today.    Review of Systems   Constitutional, HEENT, cardiovascular, pulmonary, GI, , musculoskeletal, neuro, skin, endocrine and psych systems are negative, except as otherwise noted.      Objective           Vitals:  No vitals were obtained today due to virtual visit.    Physical Exam   healthy, alert and no distress  PSYCH: Alert and oriented times 3; coherent speech, normal   rate and volume, able to articulate logical thoughts, able   to abstract reason, no tangential thoughts, no hallucinations   or delusions  Her affect is normal and pleasant  RESP: No cough, no audible wheezing, able to talk in full sentences  Remainder of exam unable to be completed due to telephone visits       Phone call duration: 10 minutes

## 2022-06-16 ENCOUNTER — PATIENT OUTREACH (OUTPATIENT)
Dept: GERIATRIC MEDICINE | Facility: CLINIC | Age: 77
End: 2022-06-16
Payer: COMMERCIAL

## 2022-06-16 NOTE — PROGRESS NOTES
[facility] Care Coordination Contact    Reason for community disenrollment: Other:  mbr moved to [facility] on 6/11/2022 per Bianca Anuyen, daughter.     Ismael Hanson RN, PHN   [facility]  [phone]

## 2022-06-20 ENCOUNTER — PATIENT OUTREACH (OUTPATIENT)
Dept: GERIATRIC MEDICINE | Facility: CLINIC | Age: 77
End: 2022-06-20
Payer: COMMERCIAL

## 2022-06-20 NOTE — PROGRESS NOTES
[facility] Care Coordination Contact    Received a request to submit a DTR for the terminated of Adult Day Care with transp, lifeline, homemaking and EW. Documentation completed and faxed to the health plan. Care Coordinator aware.    Kristal Jose RN  Utilization   [facility]  [phone]

## 2022-07-20 ENCOUNTER — PATIENT OUTREACH (OUTPATIENT)
Dept: GERIATRIC MEDICINE | Facility: CLINIC | Age: 77
End: 2022-07-20

## 2022-07-20 NOTE — PROGRESS NOTES
[facility] Care Coordination Contact    CC received request from [employer] to complete a verification of current home address.    Form completed and emailed to [email or web address] per request.    Ismael Hanson RN, PHN   [facility]  [phone]

## 2022-08-17 ENCOUNTER — PATIENT OUTREACH (OUTPATIENT)
Dept: GERIATRIC MEDICINE | Facility: CLINIC | Age: 77
End: 2022-08-17

## 2022-08-17 NOTE — PROGRESS NOTES
"[facility] Care Coordination Contact    Per CC, mailed client a \"Refusal of Home Visit\" letter.    Mailed member [employer] Self Report Health Risk Assessment with self addressed return envelope & supporting documents as required.      Katelyn Molina  Care Management Specialist  [facility]  [phone]        "

## 2022-08-17 NOTE — LETTER
June 16, 2022    Important [employer] Information    RHONA REESE  [address]  [place] [address]  I'm Here to Help  Dear Rhona,  My name is Ismael Hanson RN, PHN, and I am your [facility] Care Coordinator. When we talked, you told me that you are not interested in meeting with me to complete a health risk assessment.  As a Care Coordinator, I am here to help. My role is to make sure that your health plan is working for you. I am available to:     Review your health care needs with you over the phone or in-person     Provide support for and information about covered services or supplies to help keep you safe and healthy in your home    Answer questions about your insurance     Help you find a provider, such as a doctor or dentist, to meet your unique needs    I have included a copy of a Health Risk Assessment. Please fill it out and return it in the included envelope.  I have also included a copy of a document that provides you with more information about my role as your Care Coordinator and how I can help you with your medical, social and everyday needs.    Questions?  Call me at   Monday-Friday between 8am and 5pm. TTY: 711. If you d like, a friend or family member may call for you.   You may also call [facility] Customer Service at [phone] or [phone] (toll free) from 8 a.m. - 8 p.m. Central, seven days a week. Access to representatives may be limited at times. TTY: 711.  Sincerely,    Ismael Hanson RN, PHN  [phone]  [email or web address]    cc: member records                                                                                          CB5 ([facility]) (5-2020)    Civil Rights Notice  Discrimination is against the law. [facility] does not discriminate on the basis of any of the following:    Race    Color    National Origin    Creed    Taoism    Age    Public Assistance Status    Receipt of Health Care Services    Disability (including physical or mental impairment)    Sex (including sex  stereotypes and gender identity)    Marital Status    Political Beliefs    Medical Condition    Genetic Information    Sexual Orientation    Claims Experience    Medical History    Health Status    Auxiliary Aids and Services:  [facility] provides auxiliary aids and services, like qualified interpreters or information in accessible formats, free of charge and in a timely manner, to ensure an equal opportunity to participate in our health care programs. Contact [employer] at [employer]/contact medicaid or call [phone] (toll free); TTY:712 or at [employer]/contactNorth by South[employer].    Language Assistance Services:  [employer] provides translated documents and spoken language interpreting, free of charge and in a timely manner, when language assistance services are necessary to ensure limited English speakers have meaningful access to our information and services. Contact [employer] at [phone] (toll free); TTY: 708 or [employer]/contactmedicaid.     Civil Rights Complaints  You have the right to file a discrimination complaint if you believe you were treated in a discriminatory way by [employer]. You may contact any of the following four agencies directly to file a discrimination complaint.          U.S. Department of Health and Human Services  Office for Civil Rights (OCR)  You have the right to file a complaint with the OCR, a federal agency, if you believe you have been discriminated against because of any of the following:    Race    Disability    Color    Sex    National Origin    Age    Adventism (in some cases)    Contact the OCR directly to file a complaint:         Director         U.S. Department of Health and Human Services  Office for Civil Rights         [address] [place] [address], [place] [address]         Customer Response Center: Toll-free: [phone]          TDD: [phone]         Email: [email or web address]    [place] Department of Human Rights (Formerly [facility])  In [place],  you have the right to file a complaint with the MDHR if you believe you have been discriminated against because of any of the following:      Race    Color    National Origin    Muslim    Creed    Sex    Sexual Orientation    Marital Status    Public Assistance Status    Disability    Contact the MDHR directly to file a complaint:         [facility] of Human Rights         [address] [address]         [phone] (voice)          [phone] (toll free)         711 or [phone] ([place] Relay)         [phone] (Fax)         [email or web address] (Email)     [place] Department of Human Services (DHS)  You have the right to file a complaint with [facility] if you believe you have been discriminated against in our health care programs because of any of the following:    Race    Color    National Origin    Creed    Muslim    Age    Public Assistance Status    Receipt of Health Care Services    Disability (including physical or mental impairment)    Sex (including sex stereotypes and gender identity)    Marital Status    Political Beliefs    Medical Condition    Genetic Information    Sexual Orientation    Claims Experience    Medical History    Health Status    Complaints must be in writing and filed within 180 days of the date you discovered the alleged discrimination. The complaint must contain your name and address and describe the discrimination you are complaining about. After we get your complaint, we will review it and notify you in writing about whether we have authority to investigate. If we do, we will investigate the complaint.      [facility] will notify you in writing of the investigation s outcome. You have a right to appeal the outcome if you disagree with the decision. To appeal, you must send a written request to have [facility] review the investigation outcome. Be brief and state why you disagree with the decision. Include additional information you  think is important.      If you file a complaint in this way, the people who work for the agency named in the complaint cannot retaliate against you. This means they cannot punish you in any way for filing a complaint. Filing a complaint in this way does not stop you from seeking out other legal or administration actions.     Contact DHS directly to file a discrimination complaint:        Civil Rights Coordinator        [facility] of Human Services        Equal Opportunity and Access Division        [address] [address]        [place] [address] [phone] (voice) or use your preferred relay service     [facility] Complaint Notice   You have the right to file a complaint with [facility] if you believe you have been discriminated against because of any of the following:       Medical condition    Health status    Receipt of health care services    Claims experience    Medical history    Genetic information    Disability (including mental or physical impairment)    Marital status    Age    Sex (including sex stereotypes and gender identity)    Sexual orientation    National origin    Race    Color    Restorationism    Creed    Public assistance status    Political beliefs    You can file a complaint and ask for help in filing a complaint in person or by mail, phone, fax, or email at:     [facility] Civil Rights Coordinator  [facility]  [address], [place] [address] [phone] (voice and fax) or TTN:529  Email: [email or web address]              American Indians can begin or continue to use [place] and [facility] ([facility]) clinics. We will not require prior approval or impose any conditions for you to get services at these clinics. For elders age 65 years and older this includes Elderly Waiver (EW) services accessed through the [place]. If a doctor or other provider in a [place] or [facility] clinic refers you to a provider in our network, we will not require you to see  your primary care provider prior to the referral.

## 2022-09-11 DIAGNOSIS — T78.40XD ALLERGIC REACTION, SUBSEQUENT ENCOUNTER: ICD-10-CM

## 2022-09-11 RX ORDER — MOMETASONE FUROATE MONOHYDRATE 50 UG/1
SPRAY, METERED NASAL
Qty: 17 G | Refills: 4 | Status: SHIPPED | OUTPATIENT
Start: 2022-09-11

## 2022-09-11 NOTE — TELEPHONE ENCOUNTER
"Last Written Prescription Date:  3/15/22  Last Fill Quantity: 17,  # refills: 7   Last office visit provider:  11/8/21     Requested Prescriptions   Pending Prescriptions Disp Refills     mometasone (NASONEX) 50 MCG/ACT nasal spray [Pharmacy Med Name: MOMETASONE FUROATE 50 MCG SPRY]       Sig: INSTILL 2 SPRAYS INTO EACH NOSTIL DAILY AS NEEDED       Nasal Allergy Protocol Passed - 9/11/2022 11:00 AM        Passed - Patient is age 12 or older        Passed - Recent (12 mo) or future (30 days) visit within the authorizing provider's specialty     Patient has had an office visit with the authorizing provider or a provider within the authorizing providers department within the previous 12 mos or has a future within next 30 days. See \"Patient Info\" tab in inbasket, or \"Choose Columns\" in Meds & Orders section of the refill encounter.              Passed - Medication is active on med list             Brigette Comer RN 09/11/22 5:24 PM  "

## 2022-10-21 NOTE — PROGRESS NOTES
[facility] Care Coordination Contact    Spoke with Sonia at [facility]. She stated member contacted her for homemaking. She has a homemaker available and can start next week.  Auth will be effective on 5/19/19.     Ismael Hanson RN, PHN   [facility]  [phone]          
2 = A lot of assistance